# Patient Record
Sex: FEMALE | Race: WHITE | Employment: FULL TIME | URBAN - METROPOLITAN AREA
[De-identification: names, ages, dates, MRNs, and addresses within clinical notes are randomized per-mention and may not be internally consistent; named-entity substitution may affect disease eponyms.]

---

## 2018-10-02 PROBLEM — I49.3 VENTRICULAR PREMATURE CONTRACTIONS: Status: ACTIVE | Noted: 2018-10-02

## 2018-10-02 PROBLEM — F51.01 PRIMARY INSOMNIA: Status: ACTIVE | Noted: 2018-10-02

## 2018-10-02 PROBLEM — K21.9 GASTROESOPHAGEAL REFLUX DISEASE WITHOUT ESOPHAGITIS: Status: ACTIVE | Noted: 2018-10-02

## 2018-10-02 PROBLEM — E78.5 HYPERLIPIDEMIA: Status: ACTIVE | Noted: 2018-10-02

## 2018-10-02 PROBLEM — R06.09 DYSPNEA ON EXERTION: Status: ACTIVE | Noted: 2018-10-02

## 2018-10-02 PROBLEM — R00.0 SINUS TACHYCARDIA: Status: ACTIVE | Noted: 2018-10-02

## 2018-10-02 PROBLEM — I10 ESSENTIAL HYPERTENSION: Status: ACTIVE | Noted: 2018-10-02

## 2018-10-02 PROBLEM — B35.1 ONYCHOMYCOSIS: Status: ACTIVE | Noted: 2018-10-02

## 2018-10-02 PROBLEM — E11.9 TYPE 2 DIABETES MELLITUS WITHOUT COMPLICATION (HCC): Status: ACTIVE | Noted: 2018-10-02

## 2018-10-02 PROBLEM — E78.2 MIXED HYPERLIPIDEMIA: Status: ACTIVE | Noted: 2018-10-02

## 2018-10-02 PROBLEM — I45.10 RIGHT BUNDLE BRANCH BLOCK: Status: ACTIVE | Noted: 2018-10-02

## 2018-10-02 PROBLEM — J30.2 SEASONAL ALLERGIC RHINITIS: Status: ACTIVE | Noted: 2018-10-02

## 2023-05-31 ENCOUNTER — APPOINTMENT (OUTPATIENT)
Dept: GENERAL RADIOLOGY | Facility: CLINIC | Age: 53
End: 2023-05-31
Payer: COMMERCIAL

## 2023-05-31 ENCOUNTER — HOSPITAL ENCOUNTER (EMERGENCY)
Facility: CLINIC | Age: 53
Discharge: HOME | End: 2023-05-31
Attending: EMERGENCY MEDICINE
Payer: COMMERCIAL

## 2023-05-31 VITALS
WEIGHT: 165 LBS | HEART RATE: 88 BPM | BODY MASS INDEX: 27.49 KG/M2 | RESPIRATION RATE: 18 BRPM | DIASTOLIC BLOOD PRESSURE: 57 MMHG | TEMPERATURE: 98.3 F | OXYGEN SATURATION: 97 % | SYSTOLIC BLOOD PRESSURE: 120 MMHG | HEIGHT: 65 IN

## 2023-05-31 DIAGNOSIS — M25.561 ACUTE PAIN OF RIGHT KNEE: Primary | ICD-10-CM

## 2023-05-31 LAB
CHP ED QC CHECK: YES
GLUCOSE BLD-MCNC: 116 MG/DL (ref 65–105)

## 2023-05-31 PROCEDURE — 99283 EMERGENCY DEPT VISIT LOW MDM: CPT

## 2023-05-31 PROCEDURE — 82947 ASSAY GLUCOSE BLOOD QUANT: CPT

## 2023-05-31 PROCEDURE — 73562 X-RAY EXAM OF KNEE 3: CPT

## 2023-05-31 RX ORDER — BUSPIRONE HYDROCHLORIDE 15 MG/1
15 TABLET ORAL 2 TIMES DAILY
COMMUNITY
Start: 2022-12-22

## 2023-05-31 RX ORDER — DESVENLAFAXINE SUCCINATE 50 MG/1
TABLET, EXTENDED RELEASE ORAL
COMMUNITY
Start: 2023-01-27

## 2023-05-31 RX ORDER — GABAPENTIN 300 MG/1
CAPSULE ORAL
COMMUNITY
Start: 2019-08-01

## 2023-05-31 RX ORDER — ALBUTEROL SULFATE 90 UG/1
1 AEROSOL, METERED RESPIRATORY (INHALATION) PRN
COMMUNITY
Start: 2021-11-29

## 2023-05-31 RX ORDER — ASCORBIC ACID 500 MG
500 TABLET ORAL DAILY
COMMUNITY

## 2023-05-31 RX ORDER — INSULIN HUMAN 500 [IU]/ML
INJECTION, SOLUTION SUBCUTANEOUS
COMMUNITY
Start: 2021-08-25

## 2023-05-31 RX ORDER — FLUTICASONE FUROATE, UMECLIDINIUM BROMIDE AND VILANTEROL TRIFENATATE 100; 62.5; 25 UG/1; UG/1; UG/1
1 POWDER RESPIRATORY (INHALATION) DAILY
COMMUNITY
Start: 2023-01-19

## 2023-05-31 RX ORDER — ROPINIROLE 2 MG/1
2 TABLET, FILM COATED, EXTENDED RELEASE ORAL EVERY 12 HOURS
COMMUNITY
Start: 2022-02-01

## 2023-05-31 RX ORDER — ASCORBIC ACID
400 CRYSTALS ORAL DAILY
COMMUNITY

## 2023-05-31 ASSESSMENT — LIFESTYLE VARIABLES
HOW MANY STANDARD DRINKS CONTAINING ALCOHOL DO YOU HAVE ON A TYPICAL DAY: PATIENT DOES NOT DRINK
HOW OFTEN DO YOU HAVE A DRINK CONTAINING ALCOHOL: NEVER

## 2023-05-31 ASSESSMENT — PAIN - FUNCTIONAL ASSESSMENT: PAIN_FUNCTIONAL_ASSESSMENT: 0-10

## 2023-05-31 ASSESSMENT — PAIN SCALES - GENERAL: PAINLEVEL_OUTOF10: 7

## 2023-05-31 NOTE — ED NOTES
Pt glucose sensor alarming - BS 68, pt given snack, continue to monitor, pt is asymptomatic     Selma Spatz, RN  05/31/23 1245

## 2023-05-31 NOTE — ED PROVIDER NOTES
[address] [facility]      Pt Name: Juanita Chacon  MRN: [id number]  [place] 1970  Date of evaluation: 5/31/2023      CHIEF COMPLAINT       Chief Complaint   Patient presents with    Knee Pain         HISTORY OF PRESENT ILLNESS      The patient presents with right knee pain. This has been going on for about 2 weeks. She is not sure of any trauma. It has gotten worse with weightbearing. She tried wearing a brace but says it did not really help. She is not scheduled to see an orthopedist until the 19th of next month. She denies numbness or tingling. Pain is worse with weightbearing. She was unable to go to work today. Last night she says she thought she may have twisted it and made it hurt more. She has tried tramadol but says it did not really help. REVIEW OF SYSTEMS       All systems reviewed and negative unless noted in HPI. Denies any neck pain or stiffness. Denies chest pain or shortness of breath. Right knee pain as noted in HPI. Denies any weakness, numbness or focal neurologic deficit. Denies any skin rash or edema. No recent psychiatric issues. No easy bruising or bleeding. Denies any polyuria, polydypsia or history of immunocompromise. PAST MEDICAL HISTORY    has a past medical history of Acid reflux, Anxiety, Diabetes mellitus ([address] Hyperlipidemia, Hypertension, Migraine, and Tachycardia. SURGICAL HISTORY      has a past surgical history that includes back surgery.     CURRENT MEDICATIONS       Previous Medications    ALBUTEROL SULFATE HFA (PROVENTIL;VENTOLIN;PROAIR) 108 (90 BASE) MCG/ACT INHALER    Inhale 1 puff into the lungs as needed    ASCORBIC ACID (VITAMIN C) 500 MG TABLET    Take 1 tablet by mouth daily    ASPIRIN 81 MG TABLET    Take 1 tablet by mouth daily    ATORVASTATIN (LIPITOR) 20 MG TABLET        BUSPIRONE (BUSPAR) 15 MG TABLET    Take 15 mg by mouth 2 times daily    DESVENLAFAXINE SUCCINATE (PRISTIQ)

## 2023-05-31 NOTE — DISCHARGE INSTRUCTIONS
Apply ice and elevate. May take tramadol and ibuprofen as directed. See orthopedist tomorrow as scheduled. Return for worsening pain, swelling, numb or cool toes, or if worse in any way. Please understand that at this time there is no evidence for a more serious underlying process, but that early in the process of an illness or injury, an emergency department workup can be falsely reassuring. You should contact your family doctor within the next 48 hours for a follow up appointment    Pavithra Cedillo!!!    From [facility]) and [facility]    On behalf of the Emergency Department staff at [facility]), I would like to thank you for giving us the opportunity to address your health care needs and concerns. We hope that during your visit, our service was delivered in a professional and caring manner. Please keep [facility]) in mind as we walk with you down the path to your own personal wellness. Please expect an automated text message or email from us so we can ask a few questions about your health and progress. Based on your answers, a clinician may call you back to offer help and instructions. Please understand that early in the process of an illness or injury, an emergency department workup can be falsely reassuring. If you notice any worsening, changing or persistent symptoms please call your family doctor or return to the ER immediately. Tell us how we did during your visit at [email or web address]   and let us know about your experience

## 2023-06-01 ENCOUNTER — OFFICE VISIT (OUTPATIENT)
Dept: ORTHOPEDIC SURGERY | Age: 53
End: 2023-06-01

## 2023-06-01 VITALS — RESPIRATION RATE: 16 BRPM | WEIGHT: 165 LBS | OXYGEN SATURATION: 100 % | BODY MASS INDEX: 27.49 KG/M2 | HEIGHT: 65 IN

## 2023-06-01 DIAGNOSIS — M17.10 ARTHRITIS OF KNEE: Primary | ICD-10-CM

## 2023-06-01 DIAGNOSIS — M17.10 ARTHRITIS OF KNEE: ICD-10-CM

## 2023-06-01 DIAGNOSIS — M23.306 DEGENERATIVE TEAR OF MENISCUS OF RIGHT KNEE: Primary | ICD-10-CM

## 2023-06-01 RX ORDER — LIDOCAINE HYDROCHLORIDE 10 MG/ML
4 INJECTION, SOLUTION INFILTRATION; PERINEURAL ONCE
Status: COMPLETED | OUTPATIENT
Start: 2023-06-01 | End: 2023-06-01

## 2023-06-01 RX ORDER — TRIAMCINOLONE ACETONIDE 40 MG/ML
40 INJECTION, SUSPENSION INTRA-ARTICULAR; INTRAMUSCULAR ONCE
Status: COMPLETED | OUTPATIENT
Start: 2023-06-01 | End: 2023-06-01

## 2023-06-01 RX ADMIN — LIDOCAINE HYDROCHLORIDE 4 ML: 10 INJECTION, SOLUTION INFILTRATION; PERINEURAL at 15:15

## 2023-06-01 RX ADMIN — TRIAMCINOLONE ACETONIDE 40 MG: 40 INJECTION, SUSPENSION INTRA-ARTICULAR; INTRAMUSCULAR at 15:15

## 2023-06-22 ENCOUNTER — OFFICE VISIT (OUTPATIENT)
Dept: ORTHOPEDIC SURGERY | Age: 53
End: 2023-06-22
Payer: COMMERCIAL

## 2023-06-22 VITALS — OXYGEN SATURATION: 100 % | BODY MASS INDEX: 27.49 KG/M2 | WEIGHT: 165 LBS | HEIGHT: 65 IN | RESPIRATION RATE: 16 BRPM

## 2023-06-22 DIAGNOSIS — M17.10 ARTHRITIS OF KNEE: ICD-10-CM

## 2023-06-22 DIAGNOSIS — M23.306 DEGENERATIVE TEAR OF MENISCUS OF RIGHT KNEE: Primary | ICD-10-CM

## 2023-06-22 PROCEDURE — 99212 OFFICE O/P EST SF 10 MIN: CPT | Performed by: ORTHOPAEDIC SURGERY

## 2023-06-29 ENCOUNTER — HOSPITAL ENCOUNTER (OUTPATIENT)
Dept: MRI IMAGING | Facility: CLINIC | Age: 53
Discharge: HOME | End: 2023-07-01
Attending: ORTHOPAEDIC SURGERY
Payer: COMMERCIAL

## 2023-06-29 DIAGNOSIS — M23.306 DEGENERATIVE TEAR OF MENISCUS OF RIGHT KNEE: ICD-10-CM

## 2023-06-29 PROCEDURE — 73721 MRI JNT OF LWR EXTRE W/O DYE: CPT

## 2023-07-06 ENCOUNTER — OFFICE VISIT (OUTPATIENT)
Dept: ORTHOPEDIC SURGERY | Age: 53
End: 2023-07-06
Payer: COMMERCIAL

## 2023-07-06 DIAGNOSIS — M23.306 DEGENERATIVE TEAR OF MENISCUS OF RIGHT KNEE: Primary | ICD-10-CM

## 2023-07-06 PROCEDURE — 99213 OFFICE O/P EST LOW 20 MIN: CPT | Performed by: ORTHOPAEDIC SURGERY

## 2023-07-06 NOTE — PROGRESS NOTES
performed the services described in this documentation. All medical record entries made by the scribe were at my direction and in my presence. I have reviewed the chart and discharge instructions and agree that the records reflect my personal performance and is accurate and complete. Adam Riley MD. 07/06/23      Please note that this chart was generated using voice recognition Dragon dictation software. Although every effort was made to ensure the accuracy of this automated transcription, some errors in transcription may have occurred.

## 2023-07-10 ENCOUNTER — TELEPHONE (OUTPATIENT)
Dept: ORTHOPEDIC SURGERY | Age: 53
End: 2023-07-10

## 2023-07-10 NOTE — TELEPHONE ENCOUNTER
Patient called to see if she can schedule knee surgery with . She states she was told she would be called and has not received a call yet.

## 2023-08-11 ENCOUNTER — TELEPHONE (OUTPATIENT)
Dept: ORTHOPEDIC SURGERY | Facility: CLINIC | Age: 53
End: 2023-08-11

## 2023-08-11 NOTE — TELEPHONE ENCOUNTER
Patient called to report that she filed for her disability paperwork and the forms will be faxed over today from [facility]. Patient also states she did not receive the Knee scope information booklet that she was told she would get and wanted to see if that could be mailed to her? Address on file is correct.

## 2023-08-14 RX ORDER — DULOXETIN HYDROCHLORIDE 30 MG/1
1 CAPSULE, DELAYED RELEASE ORAL 2 TIMES DAILY
COMMUNITY
End: 2023-08-16

## 2023-08-14 RX ORDER — MELOXICAM 15 MG/1
TABLET ORAL
COMMUNITY
End: 2023-08-16

## 2023-08-14 RX ORDER — TRAZODONE HYDROCHLORIDE 100 MG/1
TABLET ORAL
COMMUNITY
Start: 2023-07-21

## 2023-08-14 RX ORDER — CYCLOBENZAPRINE HCL 10 MG
TABLET ORAL
COMMUNITY
End: 2023-08-16

## 2023-08-14 RX ORDER — HYDROCODONE BITARTRATE AND ACETAMINOPHEN 5; 325 MG/1; MG/1
1 TABLET ORAL EVERY 6 HOURS PRN
COMMUNITY
End: 2023-08-16

## 2023-08-14 RX ORDER — IBUPROFEN 800 MG/1
TABLET ORAL
COMMUNITY
End: 2023-08-16

## 2023-08-14 NOTE — TELEPHONE ENCOUNTER
[employer] forms received but no HUMAIRA signed. Called patient and let her know she needs to come in to sign that and can  the knee scope book at that time. She understood.

## 2023-08-16 ENCOUNTER — HOSPITAL ENCOUNTER (OUTPATIENT)
Dept: PREADMISSION TESTING | Age: 53
Discharge: HOME | End: 2023-08-16
Payer: COMMERCIAL

## 2023-08-16 VITALS
DIASTOLIC BLOOD PRESSURE: 54 MMHG | BODY MASS INDEX: 43.32 KG/M2 | WEIGHT: 260 LBS | OXYGEN SATURATION: 99 % | HEART RATE: 92 BPM | RESPIRATION RATE: 18 BRPM | SYSTOLIC BLOOD PRESSURE: 136 MMHG | TEMPERATURE: 98.4 F | HEIGHT: 65 IN

## 2023-08-16 LAB
ANION GAP SERPL CALCULATED.3IONS-SCNC: 12 MMOL/L (ref 9–17)
BASOPHILS # BLD: 0.12 K/UL (ref 0–0.2)
BASOPHILS NFR BLD: 1 % (ref 0–2)
BUN SERPL-MCNC: 18 MG/DL (ref 6–20)
CALCIUM SERPL-MCNC: 9.4 MG/DL (ref 8.6–10.4)
CHLORIDE SERPL-SCNC: 100 MMOL/L (ref 98–107)
CO2 SERPL-SCNC: 25 MMOL/L (ref 20–31)
CREAT SERPL-MCNC: 0.7 MG/DL (ref 0.5–0.9)
EOSINOPHIL # BLD: 0.12 K/UL (ref 0–0.4)
EOSINOPHILS RELATIVE PERCENT: 1 % (ref 0–4)
ERYTHROCYTE [DISTWIDTH] IN BLOOD BY AUTOMATED COUNT: 16.3 % (ref 11.5–14.9)
GFR SERPL CREATININE-BSD FRML MDRD: >60 ML/MIN/1.73M2
GLUCOSE SERPL-MCNC: 217 MG/DL (ref 70–99)
HCT VFR BLD AUTO: 37.8 % (ref 36–46)
HGB BLD-MCNC: 11.7 G/DL (ref 12–16)
LYMPHOCYTES NFR BLD: 2.2 K/UL (ref 1–4.8)
LYMPHOCYTES RELATIVE PERCENT: 19 % (ref 24–44)
MCH RBC QN AUTO: 23.6 PG (ref 26–34)
MCHC RBC AUTO-ENTMCNC: 31.1 G/DL (ref 31–37)
MCV RBC AUTO: 75.9 FL (ref 80–100)
MONOCYTES NFR BLD: 0.7 K/UL (ref 0.1–1.3)
MONOCYTES NFR BLD: 6 % (ref 1–7)
MORPHOLOGY: ABNORMAL
MORPHOLOGY: ABNORMAL
NEUTROPHILS NFR BLD: 73 % (ref 36–66)
NEUTS SEG NFR BLD: 8.46 K/UL (ref 1.3–9.1)
PLATELET # BLD AUTO: 244 K/UL (ref 150–450)
PMV BLD AUTO: 8.4 FL (ref 6–12)
POTASSIUM SERPL-SCNC: 4.4 MMOL/L (ref 3.7–5.3)
RBC # BLD AUTO: 4.98 M/UL (ref 4–5.2)
SODIUM SERPL-SCNC: 137 MMOL/L (ref 135–144)
WBC OTHER # BLD: 11.6 K/UL (ref 3.5–11)

## 2023-08-16 PROCEDURE — 93005 ELECTROCARDIOGRAM TRACING: CPT | Performed by: ANESTHESIOLOGY

## 2023-08-16 PROCEDURE — 80048 BASIC METABOLIC PNL TOTAL CA: CPT

## 2023-08-16 PROCEDURE — 85025 COMPLETE CBC W/AUTO DIFF WBC: CPT

## 2023-08-16 PROCEDURE — 36415 COLL VENOUS BLD VENIPUNCTURE: CPT

## 2023-08-16 RX ORDER — FEXOFENADINE HCL 180 MG/1
180 TABLET ORAL DAILY
COMMUNITY

## 2023-08-16 RX ORDER — FLUTICASONE PROPIONATE 50 MCG
1 SPRAY, SUSPENSION (ML) NASAL DAILY
COMMUNITY

## 2023-08-16 NOTE — DISCHARGE INSTRUCTIONS
Pre-op Instructions For Out-Patient Surgery    Medication Instructions:  Please stop herbs and any supplements now (includes vitamins and minerals). Please contact your surgeon and prescribing physician for pre-op instructions for any blood thinners. aspirin as directed. If you have inhalers/aerosol treatments at home, please use them the morning of your surgery and bring the inhalers with you to the hospital.    Please take the following medications the morning of your surgery with a sip of water:    buspar, inhalers, metoprolol     Surgery Instructions:  After midnight before surgery:  Do not eat or drink anything, including water, mints, gum, and hard candy. You may brush your teeth without swallowing. No smoking, chewing tobacco, or street drugs. Please shower or bathe before surgery. If you were given Surgical Scrub Chlorhexidine Gluconate Liquid (CHG), please shower the night before and the morning of your surgery following the detailed instructions you received during your pre-admission visit. Please do not wear any cologne, lotion, powder, deodorant, jewelry, piercings, perfume, makeup, nail polish, hair accessories, or hair spray on the day of surgery. Wear loose comfortable clothing. Leave your valuables at home. Bring a storage case for any glasses/contacts. An adult who is responsible for you MUST drive you home and should be with you for the first 24 hours after surgery. If having out-patient knee and foot surgeries, please arrange for planned crutches, walker, or wheelchair before arriving to the hospital.    The Day of Surgery:  Arrive at [facility] Surgery Entrance at the time directed by your surgeon and check in at the desk. If you have a living will or healthcare power of , please bring a copy. You will be taken to the pre-op holding area where you will be prepared for surgery.   A physical assessment will be performed by a nurse practitioner or house officer. Your IV will be started and you will meet your anesthesiologist.    When you go to surgery, your family will be directed to the surgical waiting room, where the doctor should speak with them after your surgery. After surgery, you will be taken to the recovery room and or short stay unit for recovery and preparation for discharge. If you use a Bi-PAP or C-PAP machine, please bring it with you and leave it in the car in case it is needed in recovery room.

## 2023-08-18 LAB
EKG ATRIAL RATE: 88 BPM
EKG P AXIS: 23 DEGREES
EKG P-R INTERVAL: 180 MS
EKG Q-T INTERVAL: 366 MS
EKG QRS DURATION: 90 MS
EKG QTC CALCULATION (BAZETT): 442 MS
EKG R AXIS: -16 DEGREES
EKG T AXIS: 28 DEGREES
EKG VENTRICULAR RATE: 88 BPM

## 2023-08-18 PROCEDURE — 93010 ELECTROCARDIOGRAM REPORT: CPT | Performed by: INTERNAL MEDICINE

## 2023-08-28 ENCOUNTER — ANESTHESIA EVENT (OUTPATIENT)
Dept: OPERATING ROOM | Age: 53
End: 2023-08-28
Payer: COMMERCIAL

## 2023-08-28 NOTE — PRE-PROCEDURE INSTRUCTIONS
No answer, left message ?    y                         Unable to leave message ? When were you told to arrive at hospital ?  9460    Do you have a  ? Are you on any blood thinners ? If yes when did you stop taking ? Do you have your prep Rx filled and instruction ? Nothing to eat the day before , only clear liquids. Are you experiencing any covid symptoms ? Do you have any infections or rash we should be aware of ? Do you have the Hibiclens soap to use the night before and the morning of surgery ? Nothing to eat or drink after midnight, only a sip of water to take any medication instructed to take the night before. Wear comfortable clothing, leave any valuables at home, remove any jewelry and body piercing .

## 2023-08-29 ENCOUNTER — HOSPITAL ENCOUNTER (OUTPATIENT)
Age: 53
Setting detail: OUTPATIENT SURGERY
Discharge: HOME | End: 2023-08-29
Attending: ORTHOPAEDIC SURGERY | Admitting: ORTHOPAEDIC SURGERY
Payer: COMMERCIAL

## 2023-08-29 ENCOUNTER — ANESTHESIA (OUTPATIENT)
Dept: OPERATING ROOM | Age: 53
End: 2023-08-29
Payer: COMMERCIAL

## 2023-08-29 VITALS
WEIGHT: 260 LBS | DIASTOLIC BLOOD PRESSURE: 64 MMHG | SYSTOLIC BLOOD PRESSURE: 138 MMHG | BODY MASS INDEX: 43.32 KG/M2 | HEIGHT: 65 IN | HEART RATE: 76 BPM | TEMPERATURE: 97.9 F | RESPIRATION RATE: 16 BRPM | OXYGEN SATURATION: 98 %

## 2023-08-29 DIAGNOSIS — S83.241A ACUTE MEDIAL MENISCUS TEAR OF RIGHT KNEE, INITIAL ENCOUNTER: Primary | ICD-10-CM

## 2023-08-29 LAB
GLUCOSE BLD-MCNC: 134 MG/DL (ref 65–105)
GLUCOSE BLD-MCNC: 140 MG/DL (ref 65–105)
HCG, PREGNANCY URINE (POC): NEGATIVE

## 2023-08-29 PROCEDURE — 7100000031 HC ASPR PHASE II RECOVERY - ADDTL 15 MIN: Performed by: ORTHOPAEDIC SURGERY

## 2023-08-29 PROCEDURE — 7100000011 HC PHASE II RECOVERY - ADDTL 15 MIN: Performed by: ORTHOPAEDIC SURGERY

## 2023-08-29 PROCEDURE — 7100000000 HC PACU RECOVERY - FIRST 15 MIN: Performed by: ORTHOPAEDIC SURGERY

## 2023-08-29 PROCEDURE — 6360000002 HC RX W HCPCS: Performed by: ORTHOPAEDIC SURGERY

## 2023-08-29 PROCEDURE — 7100000001 HC PACU RECOVERY - ADDTL 15 MIN: Performed by: ORTHOPAEDIC SURGERY

## 2023-08-29 PROCEDURE — 6360000002 HC RX W HCPCS

## 2023-08-29 PROCEDURE — 2500000003 HC RX 250 WO HCPCS

## 2023-08-29 PROCEDURE — 6360000002 HC RX W HCPCS: Performed by: ANESTHESIOLOGY

## 2023-08-29 PROCEDURE — 2580000003 HC RX 258: Performed by: ANESTHESIOLOGY

## 2023-08-29 PROCEDURE — 7100000010 HC PHASE II RECOVERY - FIRST 15 MIN: Performed by: ORTHOPAEDIC SURGERY

## 2023-08-29 PROCEDURE — 81025 URINE PREGNANCY TEST: CPT

## 2023-08-29 PROCEDURE — 3600000013 HC SURGERY LEVEL 3 ADDTL 15MIN: Performed by: ORTHOPAEDIC SURGERY

## 2023-08-29 PROCEDURE — 2709999900 HC NON-CHARGEABLE SUPPLY: Performed by: ORTHOPAEDIC SURGERY

## 2023-08-29 PROCEDURE — 7100000030 HC ASPR PHASE II RECOVERY - FIRST 15 MIN: Performed by: ORTHOPAEDIC SURGERY

## 2023-08-29 PROCEDURE — 6370000000 HC RX 637 (ALT 250 FOR IP): Performed by: ANESTHESIOLOGY

## 2023-08-29 PROCEDURE — 3600000003 HC SURGERY LEVEL 3 BASE: Performed by: ORTHOPAEDIC SURGERY

## 2023-08-29 PROCEDURE — 3700000001 HC ADD 15 MINUTES (ANESTHESIA): Performed by: ORTHOPAEDIC SURGERY

## 2023-08-29 PROCEDURE — 82947 ASSAY GLUCOSE BLOOD QUANT: CPT

## 2023-08-29 PROCEDURE — 3700000000 HC ANESTHESIA ATTENDED CARE: Performed by: ORTHOPAEDIC SURGERY

## 2023-08-29 RX ORDER — PROPOFOL 10 MG/ML
INJECTION, EMULSION INTRAVENOUS PRN
Status: DISCONTINUED | OUTPATIENT
Start: 2023-08-29 | End: 2023-08-29 | Stop reason: SDUPTHER

## 2023-08-29 RX ORDER — SODIUM CHLORIDE 0.9 % (FLUSH) 0.9 %
5-40 SYRINGE (ML) INJECTION PRN
Status: DISCONTINUED | OUTPATIENT
Start: 2023-08-29 | End: 2023-08-29 | Stop reason: HOSPADM

## 2023-08-29 RX ORDER — DEXAMETHASONE SODIUM PHOSPHATE 4 MG/ML
INJECTION, SOLUTION INTRA-ARTICULAR; INTRALESIONAL; INTRAMUSCULAR; INTRAVENOUS; SOFT TISSUE PRN
Status: DISCONTINUED | OUTPATIENT
Start: 2023-08-29 | End: 2023-08-29 | Stop reason: SDUPTHER

## 2023-08-29 RX ORDER — SODIUM CHLORIDE 9 MG/ML
INJECTION, SOLUTION INTRAVENOUS CONTINUOUS
Status: DISCONTINUED | OUTPATIENT
Start: 2023-08-29 | End: 2023-08-29 | Stop reason: HOSPADM

## 2023-08-29 RX ORDER — FENTANYL CITRATE 50 UG/ML
INJECTION, SOLUTION INTRAMUSCULAR; INTRAVENOUS PRN
Status: DISCONTINUED | OUTPATIENT
Start: 2023-08-29 | End: 2023-08-29 | Stop reason: SDUPTHER

## 2023-08-29 RX ORDER — SODIUM CHLORIDE 9 MG/ML
INJECTION, SOLUTION INTRAVENOUS PRN
Status: DISCONTINUED | OUTPATIENT
Start: 2023-08-29 | End: 2023-08-29 | Stop reason: HOSPADM

## 2023-08-29 RX ORDER — LIDOCAINE HYDROCHLORIDE 10 MG/ML
INJECTION, SOLUTION EPIDURAL; INFILTRATION; INTRACAUDAL; PERINEURAL PRN
Status: DISCONTINUED | OUTPATIENT
Start: 2023-08-29 | End: 2023-08-29 | Stop reason: SDUPTHER

## 2023-08-29 RX ORDER — SODIUM CHLORIDE 0.9 % (FLUSH) 0.9 %
5-40 SYRINGE (ML) INJECTION EVERY 12 HOURS SCHEDULED
Status: DISCONTINUED | OUTPATIENT
Start: 2023-08-29 | End: 2023-08-29 | Stop reason: HOSPADM

## 2023-08-29 RX ORDER — LIDOCAINE HYDROCHLORIDE 10 MG/ML
1 INJECTION, SOLUTION EPIDURAL; INFILTRATION; INTRACAUDAL; PERINEURAL
Status: DISCONTINUED | OUTPATIENT
Start: 2023-08-29 | End: 2023-08-29 | Stop reason: HOSPADM

## 2023-08-29 RX ORDER — KETOROLAC TROMETHAMINE 30 MG/ML
30 INJECTION, SOLUTION INTRAMUSCULAR; INTRAVENOUS ONCE
Status: COMPLETED | OUTPATIENT
Start: 2023-08-29 | End: 2023-08-29

## 2023-08-29 RX ORDER — ONDANSETRON 2 MG/ML
INJECTION INTRAMUSCULAR; INTRAVENOUS PRN
Status: DISCONTINUED | OUTPATIENT
Start: 2023-08-29 | End: 2023-08-29 | Stop reason: SDUPTHER

## 2023-08-29 RX ORDER — ACETAMINOPHEN 500 MG
1000 TABLET ORAL ONCE
Status: COMPLETED | OUTPATIENT
Start: 2023-08-29 | End: 2023-08-29

## 2023-08-29 RX ORDER — MIDAZOLAM HYDROCHLORIDE 1 MG/ML
INJECTION INTRAMUSCULAR; INTRAVENOUS PRN
Status: DISCONTINUED | OUTPATIENT
Start: 2023-08-29 | End: 2023-08-29 | Stop reason: SDUPTHER

## 2023-08-29 RX ORDER — ROPIVACAINE HYDROCHLORIDE 5 MG/ML
INJECTION, SOLUTION EPIDURAL; INFILTRATION; PERINEURAL PRN
Status: DISCONTINUED | OUTPATIENT
Start: 2023-08-29 | End: 2023-08-29 | Stop reason: ALTCHOICE

## 2023-08-29 RX ORDER — GABAPENTIN 300 MG/1
300 CAPSULE ORAL ONCE
Status: COMPLETED | OUTPATIENT
Start: 2023-08-29 | End: 2023-08-29

## 2023-08-29 RX ORDER — HYDROCODONE BITARTRATE AND ACETAMINOPHEN 5; 325 MG/1; MG/1
1 TABLET ORAL ONCE
Status: COMPLETED | OUTPATIENT
Start: 2023-08-29 | End: 2023-08-29

## 2023-08-29 RX ORDER — ONDANSETRON 2 MG/ML
4 INJECTION INTRAMUSCULAR; INTRAVENOUS
Status: DISCONTINUED | OUTPATIENT
Start: 2023-08-29 | End: 2023-08-29 | Stop reason: HOSPADM

## 2023-08-29 RX ORDER — DIPHENHYDRAMINE HYDROCHLORIDE 50 MG/ML
12.5 INJECTION INTRAMUSCULAR; INTRAVENOUS
Status: DISCONTINUED | OUTPATIENT
Start: 2023-08-29 | End: 2023-08-29 | Stop reason: HOSPADM

## 2023-08-29 RX ORDER — HYDROCODONE BITARTRATE AND ACETAMINOPHEN 5; 325 MG/1; MG/1
1 TABLET ORAL EVERY 6 HOURS PRN
Qty: 20 TABLET | Refills: 0 | Status: SHIPPED | OUTPATIENT
Start: 2023-08-29 | End: 2023-09-03

## 2023-08-29 RX ADMIN — SODIUM CHLORIDE: 9 INJECTION, SOLUTION INTRAVENOUS at 14:15

## 2023-08-29 RX ADMIN — ACETAMINOPHEN 1000 MG: 500 TABLET ORAL at 14:14

## 2023-08-29 RX ADMIN — ONDANSETRON 4 MG: 2 INJECTION INTRAMUSCULAR; INTRAVENOUS at 15:54

## 2023-08-29 RX ADMIN — FENTANYL CITRATE 25 MCG: 50 INJECTION, SOLUTION INTRAMUSCULAR; INTRAVENOUS at 16:16

## 2023-08-29 RX ADMIN — HYDROCODONE BITARTRATE AND ACETAMINOPHEN 1 TABLET: 5; 325 TABLET ORAL at 17:30

## 2023-08-29 RX ADMIN — FENTANYL CITRATE 50 MCG: 50 INJECTION, SOLUTION INTRAMUSCULAR; INTRAVENOUS at 16:04

## 2023-08-29 RX ADMIN — GABAPENTIN 300 MG: 300 CAPSULE ORAL at 14:14

## 2023-08-29 RX ADMIN — KETOROLAC TROMETHAMINE 30 MG: 30 INJECTION, SOLUTION INTRAMUSCULAR; INTRAVENOUS at 16:56

## 2023-08-29 RX ADMIN — FENTANYL CITRATE 25 MCG: 50 INJECTION, SOLUTION INTRAMUSCULAR; INTRAVENOUS at 15:47

## 2023-08-29 RX ADMIN — MIDAZOLAM 2 MG: 1 INJECTION INTRAMUSCULAR; INTRAVENOUS at 15:44

## 2023-08-29 RX ADMIN — DEXAMETHASONE SODIUM PHOSPHATE 8 MG: 4 INJECTION INTRA-ARTICULAR; INTRALESIONAL; INTRAMUSCULAR; INTRAVENOUS; SOFT TISSUE at 16:10

## 2023-08-29 RX ADMIN — LIDOCAINE HYDROCHLORIDE 50 MG: 10 INJECTION, SOLUTION EPIDURAL; INFILTRATION; INTRACAUDAL; PERINEURAL at 15:47

## 2023-08-29 RX ADMIN — PROPOFOL 50 MG: 10 INJECTION, EMULSION INTRAVENOUS at 16:04

## 2023-08-29 RX ADMIN — PROPOFOL 200 MG: 10 INJECTION, EMULSION INTRAVENOUS at 15:47

## 2023-08-29 ASSESSMENT — PAIN DESCRIPTION - DESCRIPTORS
DESCRIPTORS: ACHING
DESCRIPTORS: PRESSURE;HEAVINESS
DESCRIPTORS: ACHING

## 2023-08-29 ASSESSMENT — ENCOUNTER SYMPTOMS
SHORTNESS OF BREATH: 1
BACK PAIN: 1
APNEA: 1
DIARRHEA: 1
NAUSEA: 1
SHORTNESS OF BREATH: 1
CONSTIPATION: 1

## 2023-08-29 ASSESSMENT — PAIN DESCRIPTION - ORIENTATION
ORIENTATION: RIGHT
ORIENTATION: RIGHT

## 2023-08-29 ASSESSMENT — PAIN - FUNCTIONAL ASSESSMENT: PAIN_FUNCTIONAL_ASSESSMENT: 0-10

## 2023-08-29 ASSESSMENT — PAIN SCALES - GENERAL
PAINLEVEL_OUTOF10: 3
PAINLEVEL_OUTOF10: 3

## 2023-08-29 ASSESSMENT — PAIN DESCRIPTION - PAIN TYPE: TYPE: SURGICAL PAIN

## 2023-08-29 ASSESSMENT — PAIN DESCRIPTION - LOCATION
LOCATION: KNEE
LOCATION: KNEE

## 2023-08-29 NOTE — OP NOTE
Operative Note      Patient: Zaheer Garces  YOB: 1970  MRN: [id number]    Date of Procedure: 8/29/2023    Pre-Op Diagnosis Codes:     * Tear of medial meniscus of right knee, unspecified tear type, unspecified whether old or current tear, initial encounter [S83.241A]    Post-Op Diagnosis: Same       Procedure(s):  KNEE ARTHROSCOPY PARTIAL MEDIAL MENISCECTOMY right knee    Surgeon(s):  Adam Riley MD    Assistant:   Resident: Jayjay Martinez MD    Anesthesia: General    Estimated Blood Loss (mL): Minimal    Complications: None    Specimens:   * No specimens in log *    Implants:  * No implants in log *      Drains: * No LDAs found *    Findings: Tear of the posterior horn of the medial meniscus with 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 year old female who presents with a history of pain, locking, and giving away sensations of their right knee. Physical examination was positive for Sherri's examination. MRI was consistent with a tear of the posterior horn medial meniscus as well as some chondromalacia throughout most of the entire knee. Having failed conservative treatment, it was advised that arthroscopic examination and treatment of their knee would be beneficial and consent was obtained with risk and benefits explained to the patient. The patient was taken tot he operative room and general anesthesia was administered. A tourniquet was placed to the operatives lower extremity and then placed in the leg mckeon. The leg was exsanguinated and the tourniquet inflated to the 300mmHg. The leg was the prepped and draped in the usual sterile fashion. Time-out was called to verify laterality. Medial and lateral portals were made and the scope placed in the lateral portal. The patella femoral joint was examined and noted some mild grade 2 to grade II chondromalacia of the trochlear groove otherwise intact.  The scope

## 2023-08-29 NOTE — ANESTHESIA PRE PROCEDURE
Department of Anesthesiology  Preprocedure Note       Name:  Onesimo Carl   Age:  47 y.o.  :  1970                                          MRN:  [id number]         Date:  2023      Surgeon: Anita Simeon):  Dora Tucker MD    Procedure: Procedure(s):  KNEE ARTHROSCOPY PARTIAL MEDIAL MENISCECTOMY    Medications prior to admission:   Prior to Admission medications    Medication Sig Start Date End Date Taking? Authorizing Provider   fluticasone (FLONASE) 50 MCG/ACT nasal spray 1 spray by Each Nostril route daily    Historical Provider, MD   fexofenadine (ALLEGRA) 180 MG tablet Take 1 tablet by mouth daily    Historical Provider, MD   traZODone (DESYREL) 100 MG tablet TAKE 1/2 TO 1 TABLET BY MOUTH AT BEDTIME AS NEEDED FOR SLEEP 23   Historical Provider, MD   diclofenac sodium (VOLTAREN) 1 % GEL Apply 4 g topically 4 times daily as needed for Pain 23   Terry Alvarado MD   Semaglutide, 2 MG/DOSE, 8 MG/3ML SOPN Inject 2 mg into the skin every 7 days    Historical Provider, MD   rOPINIRole (REQUIP XL) 2 MG extended release tablet Take 1 tablet by mouth in the morning and 1 tablet in the evening.  22   Historical Provider, MD   insulin regular human (HUMULIN R U-500 KWIKPEN) 500 UNIT/ML SOPN concentrated injection pen 100 units twice daily 21   Historical Provider, MD   gabapentin (NEURONTIN) 300 MG capsule TAKE 1 CAPSULE BY MOUTH AT BEDTIME, MUST LAST 30 DAYS 19   Historical Provider, MD   fluticasone-umeclidin-vilant (TRELEGY ELLIPTA) 786-10.1-11 MCG/ACT AEPB inhaler Inhale 1 puff into the lungs daily  Patient not taking: Reported on 2023   Historical Provider, MD   desvenlafaxine succinate (PRISTIQ) 50 MG TB24 extended release tablet TAKE 1 TABLET BY MOUTH EVERY DAY, START AFTER COMPLETION OF 25MG TABS 23   Historical Provider, MD   ascorbic acid (VITAMIN C) 500 MG tablet Take 1 tablet by mouth daily    Historical Provider, MD   busPIRone (BUSPAR) 15 MG tablet Take 15

## 2023-08-29 NOTE — H&P
HISTORY and [id number]       NAME:  Pineda Yo  MRN: [id number]   YOB: 1970   Date: 8/29/2023   Age: 47 y.o. Gender: female       COMPLAINT AND PRESENT HISTORY:     Pineda Yo is 47 y.o.,  female, presents for KNEE ARTHROSCOPY PARTIAL MEDIAL MENISCECTOMY   Primary dx: Tear of medial meniscus of right knee, unspecified tear type, unspecified whether old or current tear, initial encounter [S83.769A]. HPI:    Pineda Yo is 47 y.o.,  female, undergoing preadmission testing for right Knee Meniscus Tear. Patient C/O of constant aching  pain swelling, stiffness, popping and cracking in the right Knee. Pain started last April and it getting worse. Pt describes the pain as 3/10 in intensity at times. The knee does buckle, give way under the patient. No locking up, no recent falls or trauma. No redness, swelling or rashes. Pain aggravated by walking/standing, climbing the stairs. treatment tried ibuprofen and voltren, tramadol. with minimal pain relief.    Pt denies fever/chills, chest pain or SOB    Review of additional significant medical hx:  (See chart for additional detail, including current medications /see ROS for current S/S):   HTN, HLD, DMII  Pt is on insulin , Amaryl, metformin, lisinopril ASA, Lipitor, Toprol XL  BP Readings from Last 3 Encounters:   08/16/23 (!) 136/54   05/31/23 (!) 120/57   02/23/19 126/73       Hemoglobin A1C   Date Value Ref Range Status   06/08/2015 6.6 (H) 4.0 - 6.0 % Final     ECG 8/16/23  Normal sinus rhythm  Incomplete RBBB  Otherwise normal ECG  No previous ECGs available    Lab Results   Component Value Date    WBC 11.6 (H) 08/16/2023    HGB 11.7 (L) 08/16/2023    HCT 37.8 08/16/2023    MCV 75.9 (L) 08/16/2023     08/16/2023     Lab Results   Component Value Date/Time     08/16/2023 03:18 PM    K 4.4 08/16/2023 03:18 PM     08/16/2023 03:18 PM    CO2 25 08/16/2023 03:18 PM    BUN 18 08/16/2023 03:18 PM

## 2023-08-30 ENCOUNTER — TELEPHONE (OUTPATIENT)
Dept: ORTHOPEDIC SURGERY | Age: 53
End: 2023-08-30

## 2023-08-30 NOTE — TELEPHONE ENCOUNTER
Pt is wanting to know if she is able to drive now or if she needs to be cleared at her post op appt on 9/8/23    Arth Rt Knee/PMM 8/29/23    Pt can be reached at [phone]

## 2023-08-30 NOTE — TELEPHONE ENCOUNTER
Called patient and let her know she needs to wait until her post op visit to get clearance to drive. Patient states she is doing wonderful, no pain.

## 2023-09-08 ENCOUNTER — OFFICE VISIT (OUTPATIENT)
Dept: ORTHOPEDIC SURGERY | Age: 53
End: 2023-09-08

## 2023-09-08 VITALS — RESPIRATION RATE: 14 BRPM | WEIGHT: 260 LBS | HEIGHT: 65 IN | BODY MASS INDEX: 43.32 KG/M2

## 2023-09-08 DIAGNOSIS — M23.306 DEGENERATIVE TEAR OF MENISCUS OF RIGHT KNEE: Primary | ICD-10-CM

## 2023-09-08 PROCEDURE — 99024 POSTOP FOLLOW-UP VISIT: CPT | Performed by: ORTHOPAEDIC SURGERY

## 2023-09-08 NOTE — PROGRESS NOTES
Patient returns today status post right knee arthroscopy with partial (medial/lateral) meniscectomy. Patient has no major complaints other than expected tightness/swelling with ROM. Sharp/stabbing pain has improved. On exam, portal sites are without redness or drainage. No calf tenderness; negative Sulema's sign. Motion is 0-100 degrees. No significant effusion. Assessment  Status post right knee arthroscopy    Plan  Patient given exercises to perform. Patient given activities/ motions to complete. Continue activities at home. Return to work. RTO PRN. Call with any future problems.

## 2023-10-16 SDOH — HEALTH STABILITY: PHYSICAL HEALTH: ON AVERAGE, HOW MANY MINUTES DO YOU ENGAGE IN EXERCISE AT THIS LEVEL?: 30 MIN

## 2023-10-16 SDOH — HEALTH STABILITY: PHYSICAL HEALTH: ON AVERAGE, HOW MANY DAYS PER WEEK DO YOU ENGAGE IN MODERATE TO STRENUOUS EXERCISE (LIKE A BRISK WALK)?: 3 DAYS

## 2023-10-16 ASSESSMENT — SOCIAL DETERMINANTS OF HEALTH (SDOH)
WITHIN THE LAST YEAR, HAVE TO BEEN RAPED OR FORCED TO HAVE ANY KIND OF SEXUAL ACTIVITY BY YOUR PARTNER OR EX-PARTNER?: NO
WITHIN THE LAST YEAR, HAVE YOU BEEN HUMILIATED OR EMOTIONALLY ABUSED IN OTHER WAYS BY YOUR PARTNER OR EX-PARTNER?: NO
WITHIN THE LAST YEAR, HAVE YOU BEEN KICKED, HIT, SLAPPED, OR OTHERWISE PHYSICALLY HURT BY YOUR PARTNER OR EX-PARTNER?: NO
WITHIN THE LAST YEAR, HAVE YOU BEEN AFRAID OF YOUR PARTNER OR EX-PARTNER?: NO

## 2023-10-17 ENCOUNTER — OFFICE VISIT (OUTPATIENT)
Age: 53
End: 2023-10-17

## 2023-10-17 VITALS — WEIGHT: 260 LBS | BODY MASS INDEX: 43.32 KG/M2 | HEIGHT: 65 IN

## 2023-10-17 DIAGNOSIS — M65.9 FLEXOR TENOSYNOVITIS OF FINGER: Primary | ICD-10-CM

## 2023-10-18 RX ORDER — METHYLPREDNISOLONE ACETATE 80 MG/ML
80 INJECTION, SUSPENSION INTRA-ARTICULAR; INTRALESIONAL; INTRAMUSCULAR; SOFT TISSUE ONCE
Status: COMPLETED | OUTPATIENT
Start: 2023-10-18 | End: 2023-10-18

## 2023-10-18 RX ORDER — LIDOCAINE HYDROCHLORIDE 10 MG/ML
1 INJECTION, SOLUTION INFILTRATION; PERINEURAL ONCE
Status: COMPLETED | OUTPATIENT
Start: 2023-10-18 | End: 2023-10-18

## 2023-10-18 RX ADMIN — LIDOCAINE HYDROCHLORIDE 1 ML: 10 INJECTION, SOLUTION INFILTRATION; PERINEURAL at 09:08

## 2023-10-18 RX ADMIN — LIDOCAINE HYDROCHLORIDE 1 ML: 10 INJECTION, SOLUTION INFILTRATION; PERINEURAL at 09:06

## 2023-10-18 RX ADMIN — METHYLPREDNISOLONE ACETATE 80 MG: 80 INJECTION, SUSPENSION INTRA-ARTICULAR; INTRALESIONAL; INTRAMUSCULAR; SOFT TISSUE at 09:07

## 2023-10-18 RX ADMIN — METHYLPREDNISOLONE ACETATE 80 MG: 80 INJECTION, SUSPENSION INTRA-ARTICULAR; INTRALESIONAL; INTRAMUSCULAR; SOFT TISSUE at 09:09

## 2023-10-18 NOTE — PROGRESS NOTES
Administrations This Visit       lidocaine 1 % injection 1 mL       Admin Date  10/18/2023  09:06 Action  Given Dose  1 mL Route  Intra-artICUlar Site  Hand Left Administered By  Britta Rogers MA    Ordering Provider: Rebeca Che MD    [address]: 30039-510-62    Lot#: [id number]    : [address]    Patient Supplied?: No               Admin Date  10/18/2023  09:08 Action  Given Dose  1 mL Route  Intra-artICUlar Site  Hand Right Administered By  Britta Rogers MA    Ordering Provider: Rebeca Che MD    [address]: 26313-284-89    Lot#: [id number]    : [address]    Patient Supplied?: No              methylPREDNISolone acetate (DEPO-MEDROL) injection 80 mg       Admin Date  10/18/2023  09:07 Action  Given Dose  80 mg Route  Intra-artICUlar Site  Hand Left Administered By  Britta Rogers MA    Ordering Provider: Rebeca Che MD    NDC: 0215-6875-29    Lot#: [id number]    : [address]. Patient Supplied?: No               Admin Date  10/18/2023  09:09 Action  Given Dose  80 mg Route  Intra-artICUlar Site  Hand Right Administered By  Britta Rogers MA    Ordering Provider: Rebeca Che MD    NDC: 5441-8780-18    Lot#: [id number]    : [address]. Patient Supplied?: No                    Medication was administered by provider, Dr Myles Avila. No adverse reactions.     Britta Rogers MA.
tablet by mouth in the morning and 1 tablet in the evening., Disp: , Rfl:     insulin regular human (HUMULIN R U-500 KWIKPEN) 500 UNIT/ML SOPN concentrated injection pen, 100 units twice daily, Disp: , Rfl:     gabapentin (NEURONTIN) 300 MG capsule, TAKE 1 CAPSULE BY MOUTH AT BEDTIME, MUST LAST 30 DAYS, Disp: , Rfl:     fluticasone-umeclidin-vilant (TRELEGY ELLIPTA) 100-62.5-25 MCG/ACT AEPB inhaler, Inhale 1 puff into the lungs daily (Patient not taking: Reported on 8/29/2023), Disp: , Rfl:     desvenlafaxine succinate (PRISTIQ) 50 MG TB24 extended release tablet, TAKE 1 TABLET BY MOUTH EVERY DAY, START AFTER COMPLETION OF 25MG TABS, Disp: , Rfl:     ascorbic acid (VITAMIN C) 500 MG tablet, Take 1 tablet by mouth daily, Disp: , Rfl:     busPIRone (BUSPAR) 15 MG tablet, Take 15 mg by mouth 3 times daily, Disp: , Rfl:     albuterol sulfate HFA (PROVENTIL;VENTOLIN;PROAIR) 108 (90 Base) MCG/ACT inhaler, Inhale 1 puff into the lungs as needed, Disp: , Rfl:     glimepiride (AMARYL) 4 MG tablet, Take 1 tablet by mouth every morning (before breakfast), Disp: , Rfl:     metFORMIN (GLUCOPHAGE-XR) 500 MG extended release tablet, Take 2 tablets by mouth 2 times daily, Disp: , Rfl:     rizatriptan (MAXALT) 10 MG tablet, Take 1 tablet by mouth once as needed, Disp: , Rfl:     ONE TOUCH ULTRA TEST strip, , Disp: , Rfl:     lisinopril (PRINIVIL;ZESTRIL) 10 MG tablet, Take 1 tablet by mouth daily, Disp: , Rfl:     aspirin 81 MG tablet, Take 1 tablet by mouth daily, Disp: , Rfl:     atorvastatin (LIPITOR) 20 MG tablet, , Disp: , Rfl:     metoprolol succinate (TOPROL XL) 100 MG extended release tablet, 1 tablet daily, Disp: , Rfl:     traMADol (ULTRAM) 50 MG tablet, , Disp: , Rfl:   Allergies   Allergen Reactions    Actos [Pioglitazone] Shortness Of Breath    Canagliflozin Other (See Comments)    Reglan  [Metoclopramide]      Other reaction(s): Unknown    Tuberculin Ppd Other (See Comments)    Adhesive Tape Rash     Social History

## 2023-11-21 ENCOUNTER — OFFICE VISIT (OUTPATIENT)
Dept: PODIATRY | Age: 53
End: 2023-11-21
Payer: COMMERCIAL

## 2023-11-21 VITALS — HEIGHT: 65 IN | BODY MASS INDEX: 43.32 KG/M2 | WEIGHT: 260 LBS

## 2023-11-21 DIAGNOSIS — M79.605 PAIN IN BOTH LOWER EXTREMITIES: ICD-10-CM

## 2023-11-21 DIAGNOSIS — M79.604 PAIN IN BOTH LOWER EXTREMITIES: ICD-10-CM

## 2023-11-21 DIAGNOSIS — M72.2 PLANTAR FASCIITIS: Primary | ICD-10-CM

## 2023-11-21 PROCEDURE — L3020 FOOT LONGITUD/METATARSAL SUP: HCPCS | Performed by: PODIATRIST

## 2023-11-21 PROCEDURE — 99203 OFFICE O/P NEW LOW 30 MIN: CPT | Performed by: PODIATRIST

## 2023-11-21 RX ORDER — ATOGEPANT 60 MG/1
0.5 TABLET ORAL DAILY
COMMUNITY
Start: 2023-10-30

## 2023-11-21 RX ORDER — FLUTICASONE PROPIONATE 50 MCG
SPRAY, SUSPENSION (ML) NASAL
COMMUNITY
Start: 2023-01-26

## 2023-11-21 RX ORDER — TIZANIDINE HYDROCHLORIDE 2 MG/1
2 CAPSULE, GELATIN COATED ORAL
COMMUNITY

## 2023-11-21 RX ORDER — ONDANSETRON 8 MG/1
TABLET, ORALLY DISINTEGRATING ORAL
COMMUNITY

## 2023-11-21 NOTE — PROGRESS NOTES
[address] [facility]  Dept: [phone]    NEW PATIENT PROGRESS NOTE  Date of patient's visit: 11/21/2023  Patient's Name:  Zaheer Garces YOB: 1970            Patient Care Team:  Suzan Simpson MD as PCP - General (Family Medicine)  Radu Keane DPM as Physician (Podiatry)        Chief Complaint   Patient presents with    New Patient     Re-Establish care    Foot Pain     Left foot pain x 4 months. Pt states she feels like she is walking on something. HPI:   Zaheer Garces is a 47 y.o. female who presents to the office today complaining of left foot discomfort. Patient states she feels like she is walking on something. Symptoms began 4 month(s) ago. Patient relates pain is present . Pain is rated 4 out of 10 and is described as none. Treatments prior to today's visit include: none. Currently denies F/C/N/V. Pt's primary care physician is Suzan Simpson MD last seen November 10 2023     Allergies   Allergen Reactions    Actos [Pioglitazone] Shortness Of Breath    Canagliflozin Other (See Comments)    Other      Other reaction(s): Flushing    Reglan  [Metoclopramide]      Other reaction(s): Unknown    Tuberculin Ppd Other (See Comments)    Adhesive Tape Rash       Past Medical History:   Diagnosis Date    Acid reflux     Anxiety     Asthma     Diabetes mellitus ([address])     Hyperlipidemia     Hypertension     Migraine     Sleep apnea     uses bipap    Tachycardia        Prior to Admission medications    Medication Sig Start Date End Date Taking?  Authorizing Provider   fluticasone (FLONASE) 50 MCG/ACT nasal spray 1 spray by Each Nostril route daily   Yes ProviderRupinder MD   fexofenadine (ALLEGRA) 180 MG tablet Take 1 tablet by mouth daily   Yes Provider, MD Rupinder   traZODone (DESYREL) 100 MG tablet TAKE 1/2 TO 1 TABLET BY MOUTH AT BEDTIME AS NEEDED FOR SLEEP 7/21/23  Yes

## 2024-03-15 ENCOUNTER — OFFICE VISIT (OUTPATIENT)
Dept: ORTHOPEDIC SURGERY | Age: 54
End: 2024-03-15

## 2024-03-15 DIAGNOSIS — M17.10 ARTHRITIS OF KNEE: Primary | ICD-10-CM

## 2024-03-15 SDOH — HEALTH STABILITY: PHYSICAL HEALTH: ON AVERAGE, HOW MANY MINUTES DO YOU ENGAGE IN EXERCISE AT THIS LEVEL?: 60 MIN

## 2024-03-15 SDOH — HEALTH STABILITY: PHYSICAL HEALTH: ON AVERAGE, HOW MANY DAYS PER WEEK DO YOU ENGAGE IN MODERATE TO STRENUOUS EXERCISE (LIKE A BRISK WALK)?: 5 DAYS

## 2024-03-15 NOTE — PROGRESS NOTES
Hyperlipidemia     Hypertension     Migraine     Sleep apnea     uses bipap    Tachycardia      Past Surgical History:   Procedure Laterality Date    BACK SURGERY  2003    discectomy    CARPAL TUNNEL RELEASE Right 02/10/2022    CARPAL TUNNEL RELEASE Left 02/02/2023    with trigger finger release    COLONOSCOPY      ESOPHAGOGASTRODUODENOSCOPY      KNEE ARTHROSCOPY Right 8/29/2023    KNEE ARTHROSCOPY PARTIAL MEDIAL MENISCECTOMY performed by Dean Cerrato MD at [facility] OR    SKIN BIOPSY      ears    WISDOM TOOTH EXTRACTION       No family history on file.   Plan  Patient be scheduled for MRI of her left knee to rule out medial meniscus tear.  If indeed this is positive then would consider arthroscopic intervention.  Since the patient is familiar with this procedure with all the risk and details procedure.  Will await the results of the MRI    Provider Attestation:  I, Dean Cerrato, personally performed the services described in this documentation. All medical record entries made by the scribe were at my direction and in my presence. I have reviewed the chart and discharge instructions and agree that the records reflect my personal performance and is accurate and complete. Dean Cerrato MD. 03/15/24      Please note that this chart was generated using voice recognition Dragon dictation software.  Although every effort was made to ensure the accuracy of this automated transcription, some errors in transcription may have occurred.

## 2024-04-01 ENCOUNTER — HOSPITAL ENCOUNTER (OUTPATIENT)
Dept: MRI IMAGING | Facility: CLINIC | Age: 54
Discharge: HOME | End: 2024-04-03
Payer: COMMERCIAL

## 2024-04-01 DIAGNOSIS — M17.10 ARTHRITIS OF KNEE: ICD-10-CM

## 2024-04-01 PROCEDURE — 73721 MRI JNT OF LWR EXTRE W/O DYE: CPT

## 2024-04-04 ENCOUNTER — TELEPHONE (OUTPATIENT)
Dept: ORTHOPEDIC SURGERY | Age: 54
End: 2024-04-04

## 2024-04-30 ENCOUNTER — OFFICE VISIT (OUTPATIENT)
Age: 54
End: 2024-04-30
Payer: COMMERCIAL

## 2024-04-30 DIAGNOSIS — M65.9 FLEXOR TENOSYNOVITIS OF FINGER: Primary | ICD-10-CM

## 2024-04-30 PROCEDURE — 20550 NJX 1 TENDON SHEATH/LIGAMENT: CPT | Performed by: ORTHOPAEDIC SURGERY

## 2024-04-30 PROCEDURE — 99213 OFFICE O/P EST LOW 20 MIN: CPT | Performed by: ORTHOPAEDIC SURGERY

## 2024-04-30 RX ADMIN — LIDOCAINE HYDROCHLORIDE 1 ML: 10 INJECTION, SOLUTION INFILTRATION; PERINEURAL at 10:34

## 2024-04-30 RX ADMIN — METHYLPREDNISOLONE ACETATE 40 MG: 40 INJECTION, SUSPENSION INTRA-ARTICULAR; INTRALESIONAL; INTRAMUSCULAR; SOFT TISSUE at 10:35

## 2024-04-30 NOTE — PROGRESS NOTES
[facility]      [facility]YS[facility]  [facility]THOP[facility] MEDICINE  [address]  [place] [address]  Dept: [phone]  Dept Fax: [phone]    Chief Compliant:  No chief complaint on file.       History of Present Illness:  This is a pleasant 53 y.o. female who is here today for reevaluation of her right middle finger and left middle finger.  I saw her about 6 months ago and diagnosed her with left middle finger and right middle finger flexor tenosynovitis.  I gave her cortisone injections to both fingers.  This gave her almost 6 months of relief.  She has an upcoming knee surgery with Dr. Cerrato and wanted to have her fingers reevaluated.    Physical Exam: The right middle finger has tenderness to the A1 pulley.  She has some active triggering in the office today.  There is no cutaneous lesions.  The left middle finger has some tenderness to the A1 pulley.  She has some active triggering in the office today.    Imaging: None      Assessment and Plan:    This is a pleasant 53 y.o. female who has recurrent right middle finger flexor tenosynovitis and recurrent left middle finger flexor tenosynovitis she responded well to cortisone injection about 6 months ago.  She did give about 6 months of relief setting is reasonable to repeat cortisone injection.  Is what she would like to do.  Verbal consent was obtained.  After the skin was cleansed with alcohol, I injected 40 mg of Depomedrol and local anesthetic to the right middle finger A1 pulley.  Risks of cortisone injection include but are not limited to injection site pain, hyperglycemia, and infection. The patient tolerated it well.  Verbal consent was obtained.  After the skin was cleansed with alcohol, I injected 40 mg of Depomedrol and local anesthetic to the left middle finger A1 pulley.  Risks of cortisone injection include but are not limited to injection site

## 2024-05-01 RX ORDER — METHYLPREDNISOLONE ACETATE 40 MG/ML
40 INJECTION, SUSPENSION INTRA-ARTICULAR; INTRALESIONAL; INTRAMUSCULAR; SOFT TISSUE ONCE
Status: COMPLETED | OUTPATIENT
Start: 2024-05-01 | End: 2024-04-30

## 2024-05-01 RX ORDER — LIDOCAINE HYDROCHLORIDE 10 MG/ML
1 INJECTION, SOLUTION INFILTRATION; PERINEURAL ONCE
Status: COMPLETED | OUTPATIENT
Start: 2024-05-01 | End: 2024-04-30

## 2024-05-17 ENCOUNTER — TELEPHONE (OUTPATIENT)
Dept: ORTHOPEDIC SURGERY | Age: 54
End: 2024-05-17

## 2024-05-17 NOTE — TELEPHONE ENCOUNTER
Patient called office to verify that our office received disability paperwork on her behalf from [email or web address]. Patient states that she called office yesterday to verify our fax number since there has been an on-going issue with our office receiving the paperwork via fax. I informed patient that I do not see any paperwork scanned into her chart nor do I send messages in her chart about receiving paperwork. I double-checked with the medical assistant that frequently handles Dr. Cerrato paperwork () and she denied having it as well. I provided patient with [facility] fax number [phone] again. Patient was planning on calling [employer] again about the issue but did mention that she may print the paperwork off and bring it to the office herself. Of note I do see a current [employer] HUMAIRA on file as of 8/16/23.

## 2024-05-20 ENCOUNTER — TELEPHONE (OUTPATIENT)
Dept: ORTHOPEDIC SURGERY | Age: 54
End: 2024-05-20

## 2024-05-20 RX ORDER — NYSTATIN 100000 [USP'U]/G
1 POWDER TOPICAL
COMMUNITY
Start: 2024-03-11 | End: 2024-05-23

## 2024-05-20 RX ORDER — ZOLPIDEM TARTRATE 10 MG/1
TABLET ORAL
COMMUNITY
Start: 2024-04-11

## 2024-05-20 NOTE — TELEPHONE ENCOUNTER
Patient contacted [place] [facility] in regards to paperwork.     Informed patient that no paperwork has been received by office.

## 2024-05-21 ENCOUNTER — TELEPHONE (OUTPATIENT)
Dept: ORTHOPEDIC SURGERY | Age: 54
End: 2024-05-21

## 2024-05-21 NOTE — TELEPHONE ENCOUNTER
Paperwork received by [facility].     Release of Information on file for [employer], signed 8/16/2023.    Paperwork place on [facility] desk.

## 2024-05-23 ENCOUNTER — HOSPITAL ENCOUNTER (OUTPATIENT)
Dept: PREADMISSION TESTING | Age: 54
Discharge: HOME | End: 2024-05-23
Attending: ORTHOPAEDIC SURGERY | Admitting: ORTHOPAEDIC SURGERY
Payer: COMMERCIAL

## 2024-05-23 VITALS
OXYGEN SATURATION: 97 % | HEIGHT: 65 IN | WEIGHT: 232 LBS | DIASTOLIC BLOOD PRESSURE: 75 MMHG | HEART RATE: 84 BPM | RESPIRATION RATE: 18 BRPM | BODY MASS INDEX: 38.65 KG/M2 | SYSTOLIC BLOOD PRESSURE: 157 MMHG | TEMPERATURE: 98.4 F

## 2024-05-23 LAB
ANION GAP SERPL CALCULATED.3IONS-SCNC: 12 MMOL/L (ref 9–17)
BASOPHILS # BLD: 0.08 K/UL (ref 0–0.2)
BASOPHILS NFR BLD: 1 % (ref 0–2)
BUN SERPL-MCNC: 18 MG/DL (ref 6–20)
CALCIUM SERPL-MCNC: 9.9 MG/DL (ref 8.6–10.4)
CHLORIDE SERPL-SCNC: 100 MMOL/L (ref 98–107)
CO2 SERPL-SCNC: 27 MMOL/L (ref 20–31)
CREAT SERPL-MCNC: 0.8 MG/DL (ref 0.5–0.9)
EKG ATRIAL RATE: 82 BPM
EKG P AXIS: 44 DEGREES
EKG P-R INTERVAL: 136 MS
EKG Q-T INTERVAL: 356 MS
EKG QRS DURATION: 80 MS
EKG QTC CALCULATION (BAZETT): 415 MS
EKG R AXIS: -17 DEGREES
EKG T AXIS: 52 DEGREES
EKG VENTRICULAR RATE: 82 BPM
EOSINOPHIL # BLD: 0.08 K/UL (ref 0–0.4)
EOSINOPHILS RELATIVE PERCENT: 1 % (ref 0–4)
ERYTHROCYTE [DISTWIDTH] IN BLOOD BY AUTOMATED COUNT: 16.1 % (ref 11.5–14.9)
GFR, ESTIMATED: 89 ML/MIN/1.73M2
GLUCOSE SERPL-MCNC: 215 MG/DL (ref 70–99)
HCT VFR BLD AUTO: 39.9 % (ref 36–46)
HGB BLD-MCNC: 12.6 G/DL (ref 12–16)
LYMPHOCYTES NFR BLD: 1.51 K/UL (ref 1–4.8)
LYMPHOCYTES RELATIVE PERCENT: 18 % (ref 24–44)
MCH RBC QN AUTO: 25.4 PG (ref 26–34)
MCHC RBC AUTO-ENTMCNC: 31.6 G/DL (ref 31–37)
MCV RBC AUTO: 80.2 FL (ref 80–100)
MONOCYTES NFR BLD: 0.5 K/UL (ref 0.1–1.3)
MONOCYTES NFR BLD: 6 % (ref 1–7)
MORPHOLOGY: ABNORMAL
MORPHOLOGY: ABNORMAL
NEUTROPHILS NFR BLD: 74 % (ref 36–66)
NEUTS SEG NFR BLD: 6.23 K/UL (ref 1.3–9.1)
PLATELET # BLD AUTO: 196 K/UL (ref 150–450)
PMV BLD AUTO: 8.2 FL (ref 6–12)
POTASSIUM SERPL-SCNC: 4.4 MMOL/L (ref 3.7–5.3)
RBC # BLD AUTO: 4.97 M/UL (ref 4–5.2)
SODIUM SERPL-SCNC: 139 MMOL/L (ref 135–144)
WBC OTHER # BLD: 8.4 K/UL (ref 3.5–11)

## 2024-05-23 PROCEDURE — 36415 COLL VENOUS BLD VENIPUNCTURE: CPT

## 2024-05-23 PROCEDURE — 80048 BASIC METABOLIC PNL TOTAL CA: CPT

## 2024-05-23 PROCEDURE — 85025 COMPLETE CBC W/AUTO DIFF WBC: CPT

## 2024-05-23 PROCEDURE — APPSS45 APP SPLIT SHARED TIME 31-45 MINUTES: Performed by: NURSE PRACTITIONER

## 2024-05-23 ASSESSMENT — ENCOUNTER SYMPTOMS
APNEA: 1
SHORTNESS OF BREATH: 0
TROUBLE SWALLOWING: 0
GASTROINTESTINAL NEGATIVE: 1
ABDOMINAL PAIN: 0
BACK PAIN: 1
COUGH: 0
SORE THROAT: 0

## 2024-05-23 NOTE — H&P (VIEW-ONLY)
100 05/23/2024 07:30 AM    CO2 27 05/23/2024 07:30 AM    BUN 18 05/23/2024 07:30 AM    CREATININE 0.8 05/23/2024 07:30 AM    GLUCOSE 215 05/23/2024 07:30 AM    CALCIUM 9.9 05/23/2024 07:30 AM    LABGLOM 89 05/23/2024 07:30 AM    LABGLOM >60 08/16/2023 03:18 PM       PRELIMINARY EKG REVIEW, DATE: 5/23/2024   Normal sinus rhythm  Normal ECG  SURGERY / PROVISIONAL DIAGNOSES:      KNEE ARTHROSCOPY PARTIAL MEDIAL MENISCECTOMY    Tear of medial meniscus of left knee, current, unspecified tear type, initial encounter [S83.096A]    Patient Active Problem List    Diagnosis Date Noted    Dyspnea on exertion 10/02/2018    Essential hypertension 10/02/2018    Gastroesophageal reflux disease without esophagitis 10/02/2018    Generalized anxiety disorder 10/02/2018    Hyperlipidemia 10/02/2018    Mixed hyperlipidemia 10/02/2018    Onychomycosis 10/02/2018    Primary insomnia 10/02/2018    Right bundle branch block 10/02/2018    Seasonal allergic rhinitis 10/02/2018    Sinus tachycardia 10/02/2018    Type 2 diabetes mellitus without complication (HCC) 10/02/2018    Ventricular premature contractions 10/02/2018           CLEARANCE: Based on my personal evaluation of patient including review of patient's chart, No clearance requested  for scheduled surgery     Total time spent on encounter- PAT provider minutes: 21-30 minutes     NAYA Eddy CNP on 5/23/2024 at 7:38 AM

## 2024-05-23 NOTE — H&P
TABLET BY MOUTH EVERY DAY, START AFTER COMPLETION OF 25MG TABS      ascorbic acid (VITAMIN C) 500 MG tablet Take 1 tablet by mouth daily      busPIRone (BUSPAR) 15 MG tablet Take 15 mg by mouth 3 times daily      albuterol sulfate HFA (PROVENTIL;VENTOLIN;PROAIR) 108 (90 Base) MCG/ACT inhaler Inhale 1 puff into the lungs as needed      glimepiride (AMARYL) 4 MG tablet Take 2 tablets by mouth every morning (before breakfast)      metFORMIN (GLUCOPHAGE-XR) 500 MG extended release tablet Take 2 tablets by mouth 2 times daily      rizatriptan (MAXALT) 10 MG tablet Take 1 tablet by mouth once as needed      ONE TOUCH ULTRA TEST strip       lisinopril (PRINIVIL;ZESTRIL) 10 MG tablet Take 1 tablet by mouth daily      aspirin 81 MG tablet Take 1 tablet by mouth daily      atorvastatin (LIPITOR) 20 MG tablet       metoprolol succinate (TOPROL XL) 100 MG extended release tablet 1 tablet at bedtime      traMADol (ULTRAM) 50 MG tablet 1 tablet every 6 hours as needed. migraines       No current facility-administered medications on file prior to encounter.       Review of Systems   Constitutional:  Positive for activity change (decreased) and appetite change (decreased). Negative for chills and fever.   HENT: Negative.  Negative for dental problem, ear pain, sore throat and trouble swallowing.    Eyes:  Positive for visual disturbance (wears glasses).   Respiratory:  Positive for apnea (bipap). Negative for cough and shortness of breath.    Cardiovascular:  Positive for leg swelling. Negative for chest pain and palpitations.   Gastrointestinal: Negative.  Negative for abdominal pain.   Genitourinary:  Negative for dysuria and hematuria.   Musculoskeletal:  Positive for back pain. Negative for arthralgias and neck pain.        See HPI   Skin: Negative.    Neurological:  Positive for dizziness and headaches (Migraines). Negative for seizures, speech difficulty, weakness and numbness.   Hematological:  Does not bruise/bleed easily.  100 05/23/2024 07:30 AM    CO2 27 05/23/2024 07:30 AM    BUN 18 05/23/2024 07:30 AM    CREATININE 0.8 05/23/2024 07:30 AM    GLUCOSE 215 05/23/2024 07:30 AM    CALCIUM 9.9 05/23/2024 07:30 AM    LABGLOM 89 05/23/2024 07:30 AM    LABGLOM >60 08/16/2023 03:18 PM       PRELIMINARY EKG REVIEW, DATE: 5/23/2024   Normal sinus rhythm  Normal ECG  SURGERY / PROVISIONAL DIAGNOSES:      KNEE ARTHROSCOPY PARTIAL MEDIAL MENISCECTOMY    Tear of medial meniscus of left knee, current, unspecified tear type, initial encounter [S83.171A]    Patient Active Problem List    Diagnosis Date Noted    Dyspnea on exertion 10/02/2018    Essential hypertension 10/02/2018    Gastroesophageal reflux disease without esophagitis 10/02/2018    Generalized anxiety disorder 10/02/2018    Hyperlipidemia 10/02/2018    Mixed hyperlipidemia 10/02/2018    Onychomycosis 10/02/2018    Primary insomnia 10/02/2018    Right bundle branch block 10/02/2018    Seasonal allergic rhinitis 10/02/2018    Sinus tachycardia 10/02/2018    Type 2 diabetes mellitus without complication (HCC) 10/02/2018    Ventricular premature contractions 10/02/2018           CLEARANCE: Based on my personal evaluation of patient including review of patient's chart, No clearance requested  for scheduled surgery     Total time spent on encounter- PAT provider minutes: 21-30 minutes     NAYA Eddy CNP on 5/23/2024 at 7:38 AM

## 2024-05-23 NOTE — DISCHARGE INSTRUCTIONS
Pre-op Instructions For Out-Patient Surgery    Medication Instructions:  Please stop herbs and any supplements now (includes vitamins and minerals).    Please contact your surgeon and prescribing physician for pre-op instructions for any blood thinners. Stop Aspirin as directed    If you have inhalers/aerosol treatments at home, please use them the morning of your surgery and bring the inhalers with you to the hospital.    Please take the following medications the morning of your surgery with a sip of water:    Buspar, Inhaler    Surgery Instructions:  After midnight before surgery:  Do not eat or drink anything, including water, mints, gum, and hard candy.  You may brush your teeth without swallowing.  No smoking, chewing tobacco, or street drugs.    Please shower or bathe before surgery.  If you were given Surgical Scrub Chlorhexidine Gluconate Liquid (CHG), please shower the night before and the morning of your surgery following the detailed instructions you received during your pre-admission visit.     Please do not wear any cologne, lotion, powder, deodorant, jewelry, piercings, perfume, makeup, nail polish, hair accessories, or hair spray on the day of surgery.  Wear loose comfortable clothing.    Leave your valuables at home but bring a payment source for any after-surgery prescriptions you plan to fill at [facility].  Bring a storage case for any glasses/contacts.    An adult who is responsible for you MUST drive you home and should be with you for the first 24 hours after surgery.     If having out-patient knee and foot surgeries, please arrange for planned crutches, walker, or wheelchair before arriving to the hospital.    The Day of Surgery:  Arrive at [facility] at the time directed by your surgeon and check in at the desk.     If you have a living will or healthcare power of , please bring a copy.    You will be taken to the

## 2024-05-31 LAB
EKG ATRIAL RATE: 82 BPM
EKG P AXIS: 44 DEGREES
EKG P-R INTERVAL: 136 MS
EKG Q-T INTERVAL: 356 MS
EKG QRS DURATION: 80 MS
EKG QTC CALCULATION (BAZETT): 415 MS
EKG R AXIS: -17 DEGREES
EKG T AXIS: 52 DEGREES
EKG VENTRICULAR RATE: 82 BPM

## 2024-06-05 ENCOUNTER — ANESTHESIA EVENT (OUTPATIENT)
Dept: OPERATING ROOM | Age: 54
End: 2024-06-05
Payer: COMMERCIAL

## 2024-06-05 NOTE — PRE-PROCEDURE INSTRUCTIONS
No answer, left message ?      yes                       Unable to leave message ?    When were you told to arrive at hospital ?  0700    Do you have a  ?    Are you on any blood thinners ?                     If yes when did you stop taking ?    Do you have your prep Rx filled and instruction ?      Nothing to eat the day before , only clear liquids.    Are you experiencing any covid symptoms ?     Do you have any infections or rash we should be aware of ?      Do you have the Hibiclens soap to use the night before and the morning of surgery ?    Nothing to eat or drink after midnight, only a sip of water to take any medication instructed to take the night before.  Wear comfortable clothing, leave any valuables at home, remove any jewelry and body piercing .

## 2024-06-06 ENCOUNTER — ANESTHESIA (OUTPATIENT)
Dept: OPERATING ROOM | Age: 54
End: 2024-06-06
Payer: COMMERCIAL

## 2024-06-06 ENCOUNTER — HOSPITAL ENCOUNTER (OUTPATIENT)
Age: 54
Setting detail: OUTPATIENT SURGERY
Discharge: HOME | End: 2024-06-06
Attending: ORTHOPAEDIC SURGERY | Admitting: ORTHOPAEDIC SURGERY
Payer: COMMERCIAL

## 2024-06-06 VITALS
HEART RATE: 80 BPM | DIASTOLIC BLOOD PRESSURE: 63 MMHG | WEIGHT: 232 LBS | TEMPERATURE: 96.9 F | OXYGEN SATURATION: 94 % | BODY MASS INDEX: 38.65 KG/M2 | RESPIRATION RATE: 16 BRPM | SYSTOLIC BLOOD PRESSURE: 131 MMHG | HEIGHT: 65 IN

## 2024-06-06 DIAGNOSIS — S83.242A ACUTE MEDIAL MENISCUS TEAR OF LEFT KNEE, INITIAL ENCOUNTER: Primary | ICD-10-CM

## 2024-06-06 LAB
GLUCOSE BLD-MCNC: 138 MG/DL (ref 65–105)
GLUCOSE BLD-MCNC: 186 MG/DL (ref 65–105)

## 2024-06-06 PROCEDURE — 7100000011 HC PHASE II RECOVERY - ADDTL 15 MIN: Performed by: ORTHOPAEDIC SURGERY

## 2024-06-06 PROCEDURE — 3600000003 HC SURGERY LEVEL 3 BASE: Performed by: ORTHOPAEDIC SURGERY

## 2024-06-06 PROCEDURE — 81025 URINE PREGNANCY TEST: CPT

## 2024-06-06 PROCEDURE — 7100000001 HC PACU RECOVERY - ADDTL 15 MIN: Performed by: ORTHOPAEDIC SURGERY

## 2024-06-06 PROCEDURE — 3600000013 HC SURGERY LEVEL 3 ADDTL 15MIN: Performed by: ORTHOPAEDIC SURGERY

## 2024-06-06 PROCEDURE — 82947 ASSAY GLUCOSE BLOOD QUANT: CPT

## 2024-06-06 PROCEDURE — 7100000010 HC PHASE II RECOVERY - FIRST 15 MIN: Performed by: ORTHOPAEDIC SURGERY

## 2024-06-06 PROCEDURE — 2500000003 HC RX 250 WO HCPCS: Performed by: ANESTHESIOLOGY

## 2024-06-06 PROCEDURE — 6370000000 HC RX 637 (ALT 250 FOR IP): Performed by: ANESTHESIOLOGY

## 2024-06-06 PROCEDURE — 6360000002 HC RX W HCPCS: Performed by: ANESTHESIOLOGY

## 2024-06-06 PROCEDURE — 7100000031 HC ASPR PHASE II RECOVERY - ADDTL 15 MIN: Performed by: ORTHOPAEDIC SURGERY

## 2024-06-06 PROCEDURE — 6360000002 HC RX W HCPCS: Performed by: ORTHOPAEDIC SURGERY

## 2024-06-06 PROCEDURE — 3700000001 HC ADD 15 MINUTES (ANESTHESIA): Performed by: ORTHOPAEDIC SURGERY

## 2024-06-06 PROCEDURE — 6360000002 HC RX W HCPCS: Performed by: NURSE ANESTHETIST, CERTIFIED REGISTERED

## 2024-06-06 PROCEDURE — 7100000000 HC PACU RECOVERY - FIRST 15 MIN: Performed by: ORTHOPAEDIC SURGERY

## 2024-06-06 PROCEDURE — 2709999900 HC NON-CHARGEABLE SUPPLY: Performed by: ORTHOPAEDIC SURGERY

## 2024-06-06 PROCEDURE — 3700000000 HC ANESTHESIA ATTENDED CARE: Performed by: ORTHOPAEDIC SURGERY

## 2024-06-06 PROCEDURE — 7100000030 HC ASPR PHASE II RECOVERY - FIRST 15 MIN: Performed by: ORTHOPAEDIC SURGERY

## 2024-06-06 PROCEDURE — 2580000003 HC RX 258: Performed by: ANESTHESIOLOGY

## 2024-06-06 PROCEDURE — 2500000003 HC RX 250 WO HCPCS: Performed by: NURSE ANESTHETIST, CERTIFIED REGISTERED

## 2024-06-06 RX ORDER — ONDANSETRON 2 MG/ML
4 INJECTION INTRAMUSCULAR; INTRAVENOUS
Status: COMPLETED | OUTPATIENT
Start: 2024-06-06 | End: 2024-06-06

## 2024-06-06 RX ORDER — SODIUM CHLORIDE 9 MG/ML
INJECTION, SOLUTION INTRAVENOUS CONTINUOUS
Status: DISCONTINUED | OUTPATIENT
Start: 2024-06-06 | End: 2024-06-06 | Stop reason: HOSPADM

## 2024-06-06 RX ORDER — FENTANYL CITRATE 50 UG/ML
INJECTION, SOLUTION INTRAMUSCULAR; INTRAVENOUS PRN
Status: DISCONTINUED | OUTPATIENT
Start: 2024-06-06 | End: 2024-06-06 | Stop reason: SDUPTHER

## 2024-06-06 RX ORDER — SODIUM CHLORIDE 9 MG/ML
INJECTION, SOLUTION INTRAVENOUS PRN
Status: DISCONTINUED | OUTPATIENT
Start: 2024-06-06 | End: 2024-06-06 | Stop reason: HOSPADM

## 2024-06-06 RX ORDER — HYDROCODONE BITARTRATE AND ACETAMINOPHEN 5; 325 MG/1; MG/1
1 TABLET ORAL EVERY 6 HOURS PRN
Qty: 20 TABLET | Refills: 0 | Status: SHIPPED | OUTPATIENT
Start: 2024-06-06 | End: 2024-06-11

## 2024-06-06 RX ORDER — ACETAMINOPHEN 500 MG
1000 TABLET ORAL ONCE
Status: COMPLETED | OUTPATIENT
Start: 2024-06-06 | End: 2024-06-06

## 2024-06-06 RX ORDER — KETOROLAC TROMETHAMINE 30 MG/ML
INJECTION, SOLUTION INTRAMUSCULAR; INTRAVENOUS PRN
Status: DISCONTINUED | OUTPATIENT
Start: 2024-06-06 | End: 2024-06-06 | Stop reason: SDUPTHER

## 2024-06-06 RX ORDER — DIPHENHYDRAMINE HYDROCHLORIDE 50 MG/ML
12.5 INJECTION INTRAMUSCULAR; INTRAVENOUS
Status: DISCONTINUED | OUTPATIENT
Start: 2024-06-06 | End: 2024-06-06 | Stop reason: HOSPADM

## 2024-06-06 RX ORDER — SODIUM CHLORIDE 0.9 % (FLUSH) 0.9 %
5-40 SYRINGE (ML) INJECTION EVERY 12 HOURS SCHEDULED
Status: DISCONTINUED | OUTPATIENT
Start: 2024-06-06 | End: 2024-06-06 | Stop reason: HOSPADM

## 2024-06-06 RX ORDER — GABAPENTIN 300 MG/1
300 CAPSULE ORAL ONCE
Status: COMPLETED | OUTPATIENT
Start: 2024-06-06 | End: 2024-06-06

## 2024-06-06 RX ORDER — ONDANSETRON 2 MG/ML
INJECTION INTRAMUSCULAR; INTRAVENOUS PRN
Status: DISCONTINUED | OUTPATIENT
Start: 2024-06-06 | End: 2024-06-06 | Stop reason: SDUPTHER

## 2024-06-06 RX ORDER — LIDOCAINE HYDROCHLORIDE 20 MG/ML
INJECTION, SOLUTION EPIDURAL; INFILTRATION; INTRACAUDAL; PERINEURAL PRN
Status: DISCONTINUED | OUTPATIENT
Start: 2024-06-06 | End: 2024-06-06 | Stop reason: SDUPTHER

## 2024-06-06 RX ORDER — SODIUM CHLORIDE 0.9 % (FLUSH) 0.9 %
5-40 SYRINGE (ML) INJECTION PRN
Status: DISCONTINUED | OUTPATIENT
Start: 2024-06-06 | End: 2024-06-06 | Stop reason: HOSPADM

## 2024-06-06 RX ORDER — PROPOFOL 10 MG/ML
INJECTION, EMULSION INTRAVENOUS PRN
Status: DISCONTINUED | OUTPATIENT
Start: 2024-06-06 | End: 2024-06-06 | Stop reason: SDUPTHER

## 2024-06-06 RX ORDER — ROPIVACAINE HYDROCHLORIDE 5 MG/ML
INJECTION, SOLUTION EPIDURAL; INFILTRATION; PERINEURAL PRN
Status: DISCONTINUED | OUTPATIENT
Start: 2024-06-06 | End: 2024-06-06 | Stop reason: ALTCHOICE

## 2024-06-06 RX ORDER — FENTANYL CITRATE 0.05 MG/ML
50 INJECTION, SOLUTION INTRAMUSCULAR; INTRAVENOUS EVERY 5 MIN PRN
Status: DISCONTINUED | OUTPATIENT
Start: 2024-06-06 | End: 2024-06-06 | Stop reason: HOSPADM

## 2024-06-06 RX ORDER — DEXAMETHASONE SODIUM PHOSPHATE 4 MG/ML
INJECTION, SOLUTION INTRA-ARTICULAR; INTRALESIONAL; INTRAMUSCULAR; INTRAVENOUS; SOFT TISSUE PRN
Status: DISCONTINUED | OUTPATIENT
Start: 2024-06-06 | End: 2024-06-06 | Stop reason: SDUPTHER

## 2024-06-06 RX ORDER — LIDOCAINE HYDROCHLORIDE 10 MG/ML
1 INJECTION, SOLUTION EPIDURAL; INFILTRATION; INTRACAUDAL; PERINEURAL
Status: COMPLETED | OUTPATIENT
Start: 2024-06-06 | End: 2024-06-06

## 2024-06-06 RX ADMIN — DEXAMETHASONE SODIUM PHOSPHATE 8 MG: 4 INJECTION, SOLUTION INTRAMUSCULAR; INTRAVENOUS at 08:45

## 2024-06-06 RX ADMIN — PROPOFOL 200 MG: 10 INJECTION, EMULSION INTRAVENOUS at 08:42

## 2024-06-06 RX ADMIN — SODIUM CHLORIDE: 9 INJECTION, SOLUTION INTRAVENOUS at 08:10

## 2024-06-06 RX ADMIN — FENTANYL CITRATE 50 MCG: 0.05 INJECTION, SOLUTION INTRAMUSCULAR; INTRAVENOUS at 09:43

## 2024-06-06 RX ADMIN — GABAPENTIN 300 MG: 300 CAPSULE ORAL at 08:02

## 2024-06-06 RX ADMIN — ONDANSETRON 4 MG: 2 INJECTION INTRAMUSCULAR; INTRAVENOUS at 09:48

## 2024-06-06 RX ADMIN — ACETAMINOPHEN 1000 MG: 500 TABLET ORAL at 08:02

## 2024-06-06 RX ADMIN — KETOROLAC TROMETHAMINE 30 MG: 30 INJECTION, SOLUTION INTRAMUSCULAR at 09:08

## 2024-06-06 RX ADMIN — FENTANYL CITRATE 25 MCG: 50 INJECTION, SOLUTION INTRAMUSCULAR; INTRAVENOUS at 08:57

## 2024-06-06 RX ADMIN — LIDOCAINE HYDROCHLORIDE 80 MG: 20 INJECTION, SOLUTION EPIDURAL; INFILTRATION; INTRACAUDAL; PERINEURAL at 08:41

## 2024-06-06 RX ADMIN — ONDANSETRON 4 MG: 2 INJECTION INTRAMUSCULAR; INTRAVENOUS at 08:37

## 2024-06-06 RX ADMIN — LIDOCAINE HYDROCHLORIDE 1 ML: 10 INJECTION, SOLUTION EPIDURAL; INFILTRATION; INTRACAUDAL; PERINEURAL at 08:02

## 2024-06-06 ASSESSMENT — ENCOUNTER SYMPTOMS: SHORTNESS OF BREATH: 1

## 2024-06-06 ASSESSMENT — PAIN DESCRIPTION - ORIENTATION
ORIENTATION: LEFT
ORIENTATION: LEFT

## 2024-06-06 ASSESSMENT — PAIN DESCRIPTION - DESCRIPTORS
DESCRIPTORS: ACHING;DULL
DESCRIPTORS: ACHING;DULL
DESCRIPTORS: BURNING

## 2024-06-06 ASSESSMENT — PAIN DESCRIPTION - PAIN TYPE
TYPE: SURGICAL PAIN
TYPE: SURGICAL PAIN

## 2024-06-06 ASSESSMENT — PAIN - FUNCTIONAL ASSESSMENT
PAIN_FUNCTIONAL_ASSESSMENT: NONE - DENIES PAIN
PAIN_FUNCTIONAL_ASSESSMENT: 0-10
PAIN_FUNCTIONAL_ASSESSMENT: 0-10

## 2024-06-06 ASSESSMENT — PAIN SCALES - GENERAL
PAINLEVEL_OUTOF10: 2
PAINLEVEL_OUTOF10: 5

## 2024-06-06 ASSESSMENT — PAIN DESCRIPTION - LOCATION
LOCATION: KNEE
LOCATION: KNEE

## 2024-06-06 NOTE — INTERVAL H&P NOTE
Update History & Physical    The patient's History and Physical of May 23, 2024 was reviewed with the patient and I examined the patient. There was no change. Here today for KNEE ARTHROSCOPY PARTIAL MEDIAL MENISCECTOMY - Left per Dr. Cerrato.    Pt AAO x 3 in NAD. HRRR. No adventitious lung sounds. No respiratory distress. NPO p MN. Took no medications this am. Stopped aspirin, vitamins/supplements two weeks ago. Denies taking any other blood thinning medications. Denies recent or current chest pain/pressure, palpitations, SOB, recent URI, fever or chills. Review vitals per RN flowsheet.       Electronically signed by NAYA Wyatt CNP on 6/6/2024 at 7:16 AM

## 2024-06-06 NOTE — DISCHARGE INSTRUCTIONS
Knee Arthroscopy: What to Expect at Home  Your Recovery     Arthroscopy is a way to find problems and do surgery inside a joint without making a large cut (incision). Your doctor put a lighted tube with a tiny camera--called an arthroscope, or scope--and surgical tools through small incisions in your knee.  You will feel tired for several days. Your knee will be swollen. And you may notice that your skin is a different color near the cuts (incisions). The swelling is normal and will start to go away in a few days. Keeping your leg higher than your heart will help with swelling and pain.  You will probably need about 6 weeks to recover. If your doctor repaired damaged tissue, recovery will take longer. You may have to limit your activity until your knee strength and movement are back to normal. You may also be in a physical rehabilitation (rehab) program.  You may be able to return to a desk job or your normal routine in a few days. But if you do physical labor, it may be a few weeks to a few months before you can go back to work.  This care sheet gives you a general idea about how long it will take for you to recover. But each person recovers at a different pace. Follow the steps below to get better as quickly as possible.  How can you care for yourself at home?  Activity    Rest when you feel tired. Getting enough sleep will help you recover. Use pillows to raise your ankle and leg above the level of your heart.     Try to walk each day, after your doctor has said you can. Start by walking a little more than you did the day before. Bit by bit, increase the amount you walk. Walking boosts blood flow and helps prevent pneumonia and constipation.     You may have a brace or crutches or both.     Your doctor will tell you how often and how much you can move your leg and knee.     If you have a desk job, you may be able to return to work a few days after the surgery. If you lift things or stand or walk a lot at

## 2024-06-06 NOTE — PROGRESS NOTES
CLINICAL PHARMACY NOTE: MEDS TO BEDS    Total # of Prescriptions Filled: 1   The following medications were delivered to the patient:  Hydrocodone/APAP 5-325MG Tablets     Additional Documentation:  Picked up at the pharmacy by the PT at 11:04AM 6/6/24

## 2024-06-06 NOTE — ANESTHESIA PRE PROCEDURE
Department of Anesthesiology  Preprocedure Note       Name:  Lissette Griffiths   Age:  53 y.o.  :  1970                                          MRN:  [id number]         Date:  2024      Surgeon: Surgeon(s):  Dean Cerrato MD    Procedure: Procedure(s):  KNEE ARTHROSCOPY PARTIAL MEDIAL MENISCECTOMY    Medications prior to admission:   Prior to Admission medications    Medication Sig Start Date End Date Taking? Authorizing Provider   HYDROcodone-acetaminophen (NORCO) 5-325 MG per tablet Take 1 tablet by mouth every 6 hours as needed for Pain for up to 5 days. Intended supply: 5 days. Take lowest dose possible to manage pain Max Daily Amount: 4 tablets 24 Yes Dean Cerrato MD   zolpidem (AMBIEN) 10 MG tablet 1 tablet at bedtime as needed Orally Once a day for 30 days 24   Rupinder Shelton MD   tiZANidine (ZANAFLEX) 2 MG capsule Take 1 capsule by mouth 3 times daily as needed    Rupinder Shelton MD   ondansetron (ZOFRAN-ODT) 8 MG TBDP disintegrating tablet     Rupinder Shelton MD   fluticasone (FLONASE) 50 MCG/ACT nasal spray INSTILL 1 SPRAY IN EACH NOSTRIL ONE TIME A DAY 23   Rupinder Shelton MD   QULIPTA 60 MG TABS Take 1 tablet by mouth at bedtime 10/30/23   Rupinder Shelton MD   fexofenadine (ALLEGRA) 180 MG tablet Take 1 tablet by mouth daily    Rupinder Shelton MD   traZODone (DESYREL) 100 MG tablet 1.5 tablets nightly 23   Rupinder Shelton MD   diclofenac sodium (VOLTAREN) 1 % GEL Apply 4 g topically 4 times daily as needed for Pain  Patient taking differently: Apply 4 g topically daily 23   Buddy Titus MD   Semaglutide, 2 MG/DOSE, 8 MG/3ML SOPN Inject 2 mg into the skin every 7 days     Rupinder Shelton MD   rOPINIRole (REQUIP XL) 2 MG extended release tablet Take 1 tablet by mouth in the morning and 1 tablet in the evening. 22   Rupinder Shelton MD   insulin regular human (HUMULIN R U-500 KWIKPEN) 500

## 2024-06-06 NOTE — OP NOTE
femoral joint was examined and noted to have several areas of exposed bone on the lateral facet of the patella. The scope was then passes down the medial gutter into the medial compartment. A probe was used to assess the medial meniscus and a tear was identified in the posterior horn and into the root ligament of the medial meniscus. A partial medial menisectomy was carried ou with basket forceps and then smoothed out with a shaver. Repeat probing of the meniscus found it to be stable. There was no significant cartilage damage or wear.     The arthroscope was then passed into the notch of the knee. The ACL was found not to have any significant damage or laxity.     The scope was then passed in the lateral compartment. Thorough probing of the lateral meniscus and the articular cartilage did not demonstrate any significant finding that requires surgical intervention    The scope was then passed into the suprapatellar area and the shaver was used to remove any further soft tissue debris. The scope was removed and the knee evacuated of fluid and injected with 20cc of 0.5% ropivacaine. The sterile bulky dressing was applied and the leg then wrapped with a large 6-inch ace bandage from toes to the mid-thigh. The tourniquet was then deflated at less than 30 minutes. The patient was awaken and taken to the PACU in good condition.     Electronically signed by Dean Cerrato MD on 6/6/2024 at 9:13 AM

## 2024-06-06 NOTE — ANESTHESIA POSTPROCEDURE EVALUATION
Department of Anesthesiology  Postprocedure Note    Patient: Lissette Griffiths  MRN: [id number]  YOB: 1970  Date of evaluation: 6/6/2024    Procedure Summary       Date: 06/06/24 Room / Location: [address]    Anesthesia Start: 0836 Anesthesia Stop: 0923    Procedure: KNEE ARTHROSCOPY PARTIAL MEDIAL MENISCECTOMY (Left: Knee) Diagnosis:       Tear of medial meniscus of left knee, current, unspecified tear type, initial encounter      (Tear of medial meniscus of left knee, current, unspecified tear type, initial encounter [S83.242A])    Surgeons: Dean Cerrato MD Responsible Provider: Ayse Abdalla MD    Anesthesia Type: General ASA Status: 3            Anesthesia Type: General    Rashad Phase I: Rashad Score: 10    Rashad Phase II:      Anesthesia Post Evaluation    Comments: POST- ANESTHESIA EVALUATION       Pt Name: Lissette Griffiths  MRN: [id number]  YOB: 1970  Date of evaluation: 6/6/2024  Time:  11:05 AM      /63   Pulse 80   Temp 96.9 °F (36.1 °C) (Infrared)   Resp 16   Ht 1.651 m (5' 5\")   Wt 105.2 kg (232 lb)   LMP 01/01/2024 Comment: UCG neg preop 6/6/24  SpO2 94%   BMI 38.61 kg/m²      Consciousness Level  Awake  Cardiopulmonary Status  Stable  Pain Adequately Treated YES  Nausea / Vomiting  NO  Adequate Hydration  YES  Anesthesia Related Complications NONE      Electronically signed by Ayse Abdalla MD on 6/6/2024 at 11:05 AM           No notable events documented.

## 2024-06-21 ENCOUNTER — OFFICE VISIT (OUTPATIENT)
Dept: ORTHOPEDIC SURGERY | Age: 54
End: 2024-06-21

## 2024-06-21 DIAGNOSIS — Z98.890 S/P LEFT KNEE ARTHROSCOPY: Primary | ICD-10-CM

## 2024-06-21 PROCEDURE — 99024 POSTOP FOLLOW-UP VISIT: CPT | Performed by: ORTHOPAEDIC SURGERY

## 2024-06-21 NOTE — PROGRESS NOTES
Patient returns today status post left knee arthroscopy with partial (medial/lateral) meniscectomy. Patient has no major complaints other than expected tightness/swelling with ROM. Sharp/stabbing pain has improved.     On exam, portal sites are without redness or drainage. No calf tenderness; negative Sulema's sign. Motion is 0-100 degrees. No significant effusion.     Assessment  Status post left knee arthroscopy    Plan  Patient given exercises to perform. Patient given activities/ motions to complete. Continue activities at home. Return to work. RTO PRN. Call with any future problems.

## 2024-06-22 ENCOUNTER — HOSPITAL ENCOUNTER (EMERGENCY)
Age: 54
Discharge: HOME | End: 2024-06-22
Attending: EMERGENCY MEDICINE
Payer: COMMERCIAL

## 2024-06-22 VITALS
BODY MASS INDEX: 37.99 KG/M2 | DIASTOLIC BLOOD PRESSURE: 68 MMHG | WEIGHT: 228 LBS | HEART RATE: 90 BPM | OXYGEN SATURATION: 98 % | HEIGHT: 65 IN | SYSTOLIC BLOOD PRESSURE: 110 MMHG | TEMPERATURE: 98 F | RESPIRATION RATE: 18 BRPM

## 2024-06-22 DIAGNOSIS — R19.7 DIARRHEA, UNSPECIFIED TYPE: ICD-10-CM

## 2024-06-22 DIAGNOSIS — R11.2 NAUSEA AND VOMITING, UNSPECIFIED VOMITING TYPE: Primary | ICD-10-CM

## 2024-06-22 LAB
ALBUMIN SERPL-MCNC: 4.4 G/DL (ref 3.5–5.2)
ALP SERPL-CCNC: 102 U/L (ref 35–104)
ALT SERPL-CCNC: 33 U/L (ref 5–33)
AMORPH SED URNS QL MICRO: ABNORMAL
ANION GAP SERPL CALCULATED.3IONS-SCNC: 15 MMOL/L (ref 9–17)
AST SERPL-CCNC: 32 U/L
BASOPHILS # BLD: 0.07 K/UL (ref 0–0.2)
BASOPHILS NFR BLD: 1 % (ref 0–2)
BILIRUB DIRECT SERPL-MCNC: 0.1 MG/DL
BILIRUB INDIRECT SERPL-MCNC: 0.3 MG/DL (ref 0–1)
BILIRUB SERPL-MCNC: 0.4 MG/DL (ref 0.3–1.2)
BILIRUB UR QL STRIP: NEGATIVE
BUN SERPL-MCNC: 19 MG/DL (ref 6–20)
BUN/CREAT SERPL: 17 (ref 9–20)
CALCIUM SERPL-MCNC: 10.5 MG/DL (ref 8.6–10.4)
CHLORIDE SERPL-SCNC: 96 MMOL/L (ref 98–107)
CLARITY UR: ABNORMAL
CO2 SERPL-SCNC: 26 MMOL/L (ref 20–31)
COLOR UR: YELLOW
CREAT SERPL-MCNC: 1.1 MG/DL (ref 0.5–0.9)
EOSINOPHIL # BLD: 0.13 K/UL (ref 0–0.44)
EOSINOPHILS RELATIVE PERCENT: 1 % (ref 1–4)
EPI CELLS #/AREA URNS HPF: ABNORMAL /HPF (ref 0–5)
ERYTHROCYTE [DISTWIDTH] IN BLOOD BY AUTOMATED COUNT: 16.3 % (ref 11.8–14.4)
GFR, ESTIMATED: 60 ML/MIN/1.73M2
GLUCOSE SERPL-MCNC: 105 MG/DL (ref 70–99)
GLUCOSE UR STRIP-MCNC: NEGATIVE MG/DL
HCT VFR BLD AUTO: 46.1 % (ref 36.3–47.1)
HGB BLD-MCNC: 14.1 G/DL (ref 11.9–15.1)
HGB UR QL STRIP.AUTO: NEGATIVE
IMM GRANULOCYTES # BLD AUTO: 0.05 K/UL (ref 0–0.3)
IMM GRANULOCYTES NFR BLD: 0 %
KETONES UR STRIP-MCNC: ABNORMAL MG/DL
LACTATE BLDV-SCNC: 2.5 MMOL/L (ref 0.5–1.9)
LEUKOCYTE ESTERASE UR QL STRIP: ABNORMAL
LIPASE SERPL-CCNC: 70 U/L (ref 13–60)
LYMPHOCYTES NFR BLD: 2.5 K/UL (ref 1.1–3.7)
LYMPHOCYTES RELATIVE PERCENT: 21 % (ref 24–43)
MAGNESIUM SERPL-MCNC: 2 MG/DL (ref 1.6–2.6)
MCH RBC QN AUTO: 25.5 PG (ref 25.2–33.5)
MCHC RBC AUTO-ENTMCNC: 30.6 G/DL (ref 28.4–34.8)
MCV RBC AUTO: 83.2 FL (ref 82.6–102.9)
MONOCYTES NFR BLD: 0.99 K/UL (ref 0.1–1.2)
MONOCYTES NFR BLD: 8 % (ref 3–12)
NEUTROPHILS NFR BLD: 69 % (ref 36–65)
NEUTS SEG NFR BLD: 8.46 K/UL (ref 1.5–8.1)
NITRITE UR QL STRIP: NEGATIVE
NRBC BLD-RTO: 0 PER 100 WBC
PH UR STRIP: 6 [PH] (ref 5–8)
PLATELET # BLD AUTO: 283 K/UL (ref 138–453)
PMV BLD AUTO: 9.6 FL (ref 8.1–13.5)
POTASSIUM SERPL-SCNC: 4.7 MMOL/L (ref 3.7–5.3)
PROT SERPL-MCNC: 7.8 G/DL (ref 6.4–8.3)
PROT UR STRIP-MCNC: NEGATIVE MG/DL
RBC # BLD AUTO: 5.54 M/UL (ref 3.95–5.11)
RBC # BLD: ABNORMAL 10*6/UL
RBC #/AREA URNS HPF: ABNORMAL /HPF (ref 0–2)
SODIUM SERPL-SCNC: 137 MMOL/L (ref 135–144)
SP GR UR STRIP: 1.01 (ref 1–1.03)
UROBILINOGEN UR STRIP-ACNC: NORMAL EU/DL (ref 0–1)
WBC #/AREA URNS HPF: ABNORMAL /HPF (ref 0–5)
WBC OTHER # BLD: 12.2 K/UL (ref 3.5–11.3)

## 2024-06-22 PROCEDURE — 83690 ASSAY OF LIPASE: CPT

## 2024-06-22 PROCEDURE — 81001 URINALYSIS AUTO W/SCOPE: CPT

## 2024-06-22 PROCEDURE — 83605 ASSAY OF LACTIC ACID: CPT

## 2024-06-22 PROCEDURE — 85025 COMPLETE CBC W/AUTO DIFF WBC: CPT

## 2024-06-22 PROCEDURE — 99284 EMERGENCY DEPT VISIT MOD MDM: CPT

## 2024-06-22 PROCEDURE — 96374 THER/PROPH/DIAG INJ IV PUSH: CPT

## 2024-06-22 PROCEDURE — 2580000003 HC RX 258: Performed by: EMERGENCY MEDICINE

## 2024-06-22 PROCEDURE — 6360000002 HC RX W HCPCS: Performed by: EMERGENCY MEDICINE

## 2024-06-22 PROCEDURE — 80048 BASIC METABOLIC PNL TOTAL CA: CPT

## 2024-06-22 PROCEDURE — 83735 ASSAY OF MAGNESIUM: CPT

## 2024-06-22 PROCEDURE — 80076 HEPATIC FUNCTION PANEL: CPT

## 2024-06-22 RX ORDER — ONDANSETRON 2 MG/ML
4 INJECTION INTRAMUSCULAR; INTRAVENOUS ONCE
Status: COMPLETED | OUTPATIENT
Start: 2024-06-22 | End: 2024-06-22

## 2024-06-22 RX ORDER — PROMETHAZINE HYDROCHLORIDE 25 MG/1
25 TABLET ORAL EVERY 6 HOURS PRN
Qty: 20 TABLET | Refills: 0 | Status: SHIPPED | OUTPATIENT
Start: 2024-06-22 | End: 2024-06-29

## 2024-06-22 RX ORDER — 0.9 % SODIUM CHLORIDE 0.9 %
1000 INTRAVENOUS SOLUTION INTRAVENOUS ONCE
Status: COMPLETED | OUTPATIENT
Start: 2024-06-22 | End: 2024-06-22

## 2024-06-22 RX ADMIN — ONDANSETRON 4 MG: 2 INJECTION INTRAMUSCULAR; INTRAVENOUS at 10:03

## 2024-06-22 RX ADMIN — SODIUM CHLORIDE 1000 ML: 9 INJECTION, SOLUTION INTRAVENOUS at 10:00

## 2024-06-22 ASSESSMENT — ENCOUNTER SYMPTOMS
VOMITING: 0
COLOR CHANGE: 0
COUGH: 0
SHORTNESS OF BREATH: 0
ABDOMINAL PAIN: 0
EYE REDNESS: 0
EYE DISCHARGE: 0
DIARRHEA: 1
RHINORRHEA: 0
NAUSEA: 1
SORE THROAT: 0

## 2024-06-22 ASSESSMENT — PAIN - FUNCTIONAL ASSESSMENT: PAIN_FUNCTIONAL_ASSESSMENT: NONE - DENIES PAIN

## 2024-06-22 NOTE — FLOWSHEET NOTE
Pt arrives ambulatory with c/o nausea for the pat 5b days pt relates pain to new med quelbree (sp) pt has stopped med but intermittent nausea persists upon exam pt alert and in nad resp non labored

## 2024-06-22 NOTE — DISCHARGE INSTR - COC
Continuity of Care Form    Patient Name: Lissette Griffiths   :  1970  MRN:  [id number]    Admit date:  2024  Discharge date:  ***    Code Status Order: No Order   Advance Directives:     Admitting Physician:  No admitting provider for patient encounter.  PCP: Enrico Reynoso MD    Discharging Nurse: ***  Discharging Hospital Unit/Room#: STA15/15  Discharging Unit Phone Number: ***    Emergency Contact:   Extended Emergency Contact Information  Primary Emergency Contact: Harrison Olivares  Address: [address] [place] [address]  Home Phone: [phone]  Relation: Niece/Nephew    Past Surgical History:  Past Surgical History:   Procedure Laterality Date    BACK SURGERY      discectomy    CARPAL TUNNEL RELEASE Right 02/10/2022    CARPAL TUNNEL RELEASE Left 2023    with trigger finger release    COLONOSCOPY      ESOPHAGOGASTRODUODENOSCOPY      KNEE ARTHROSCOPY Right 2023    KNEE ARTHROSCOPY PARTIAL MEDIAL MENISCECTOMY performed by Dean Cerrato MD at [facility] OR    KNEE ARTHROSCOPY Left 2024    KNEE ARTHROSCOPY PARTIAL MEDIAL MENISCECTOMY performed by Dean Cerrato MD at [facility] OR    LIVER BIOPSY      SKIN BIOPSY      ears    WISDOM TOOTH EXTRACTION         Immunization History:   Immunization History   Administered Date(s) Administered    COVID-19, MODERNA, (- formula), (age 12y+), IM, 50mcg/0.5mL 2024    COVID-19, PFIZER Bivalent, DO NOT Dilute, (age 12y+), IM, 30 mcg/0.3 mL 2022       Active Problems:  Patient Active Problem List   Diagnosis Code    Dyspnea on exertion R06.09    Essential hypertension I10    Gastroesophageal reflux disease without esophagitis K21.9    Generalized anxiety disorder F41.1    Hyperlipidemia E78.5    Mixed hyperlipidemia E78.2    Onychomycosis B35.1    Primary insomnia F51.01    Right bundle branch block I45.10    Seasonal allergic rhinitis J30.2    Sinus tachycardia R00.0    Type 2 diabetes mellitus without

## 2024-06-22 NOTE — ED PROVIDER NOTES
EMERGENCY DEPARTMENT ENCOUNTER    Pt Name: Lissette Griffiths  MRN: [id number]  Birthdate 1970  Date of evaluation: 6/22/24  CHIEF COMPLAINT       Chief Complaint   Patient presents with    Emesis     Sts for 5 days  started new med qelbree same day  stopped med on thursday     HISTORY OF PRESENT ILLNESS   53-year-old presents with complaints of nausea, diarrhea.  Patient began having some nausea few days ago followed by loose watery diarrhea yesterday and the day before.  She describes her symptoms as severe, without any specific aggravating relieving factors.  Mild crampy abdominal pain when she has to have a bowel movement.           REVIEW OF SYSTEMS     Review of Systems   Constitutional:  Negative for chills and fever.   HENT:  Negative for rhinorrhea and sore throat.    Eyes:  Negative for discharge, redness and visual disturbance.   Respiratory:  Negative for cough and shortness of breath.    Cardiovascular:  Negative for chest pain, palpitations and leg swelling.   Gastrointestinal:  Positive for diarrhea and nausea. Negative for abdominal pain and vomiting.   Genitourinary:  Negative for dysuria and hematuria.   Musculoskeletal:  Negative for arthralgias, myalgias and neck pain.   Skin:  Negative for color change and rash.   Neurological:  Negative for seizures, weakness and headaches.   Psychiatric/Behavioral:  Negative for hallucinations, self-injury and suicidal ideas.      PASTMEDICAL HISTORY     Past Medical History:   Diagnosis Date    Acid reflux     Anxiety     Asthma     Diabetes mellitus (HCC)     History of sleep walking     Hyperlipidemia     Hypertension     Migraine     STUART (nonalcoholic steatohepatitis)     Sleep apnea     uses bipap    Tachycardia     Thrush      Past Problem List  Patient Active Problem List   Diagnosis Code    Dyspnea on exertion R06.09    Essential hypertension I10    Gastroesophageal reflux disease without esophagitis K21.9    Generalized anxiety disorder F41.1

## 2024-07-29 RX ADMIN — LIDOCAINE HYDROCHLORIDE 0.5 ML: 10 INJECTION, SOLUTION INFILTRATION; PERINEURAL at 15:51

## 2024-07-30 ENCOUNTER — OFFICE VISIT (OUTPATIENT)
Age: 54
End: 2024-07-30

## 2024-07-30 VITALS — BODY MASS INDEX: 37.99 KG/M2 | WEIGHT: 228 LBS | HEIGHT: 65 IN

## 2024-07-30 DIAGNOSIS — M77.8 RIGHT ELBOW TENDONITIS: ICD-10-CM

## 2024-07-30 DIAGNOSIS — M77.11 LATERAL EPICONDYLITIS, RIGHT ELBOW: Primary | ICD-10-CM

## 2024-07-30 RX ORDER — METHYLPREDNISOLONE ACETATE 80 MG/ML
40 INJECTION, SUSPENSION INTRA-ARTICULAR; INTRALESIONAL; INTRAMUSCULAR; SOFT TISSUE ONCE
Status: COMPLETED | OUTPATIENT
Start: 2024-07-30 | End: 2024-07-30

## 2024-07-30 RX ORDER — LIDOCAINE HYDROCHLORIDE 10 MG/ML
0.5 INJECTION, SOLUTION INFILTRATION; PERINEURAL ONCE
Status: COMPLETED | OUTPATIENT
Start: 2024-07-30 | End: 2024-07-29

## 2024-07-30 RX ADMIN — METHYLPREDNISOLONE ACETATE 40 MG: 80 INJECTION, SUSPENSION INTRA-ARTICULAR; INTRALESIONAL; INTRAMUSCULAR; SOFT TISSUE at 15:52

## 2024-07-30 NOTE — PROGRESS NOTES
[facility]      [facility] PHYSIC[facility]ANS [facility]  [facility]  [address]  [place] [address]  Dept: [phone]  Dept Fax: [phone]    Chief Compliant:  Chief Complaint   Patient presents with    Elbow Pain     Right elbow        History of Present Illness:  This is a pleasant 53 y.o. female who is here for evaluation of right elbow pain. States this has been on and off for the past 2 years but is now flared up again. States she had been wearing a tennis elbow brace which helped but now it not helping. States pain is mainly when lifting with the right arm. Pain is localized to the outside of the elbow. Denies any numbness or tingling. Denies any recent injuries or falls.     Physical Exam: Right elbow: Skin intact. Good range of motion of the elbow without pain. TTP over lateral epicondyle. Lateral elbow pain with resisted wrist extension and resisted middle finger extension.     Imaging: None      Assessment and Plan:    This is a pleasant 53 y.o. female who has right elbow lateral epicondylitis. Discussed etiology of symptoms and treatment options. Offered cortisone injection today for acute pain relief followed by continue use of brace and provided patient with tennis elbow exercises. Verbal consent was obtained. After the skin was cleansed with alcohol I injected 40 mg of Depo-Medrol and local anesthetic into the right elbow lateral epicondyle fenestrating the ECRB tendon.  Cortisone injection risks include infection, hyperglycemia, post injection pain. Patient tolerated procedure well. Ice, NSAIDs/Tylenol for pain. Patient may follow up as needed.            Past History:    Current Outpatient Medications:     zolpidem (AMBIEN) 10 MG tablet, 1 tablet at bedtime as needed Orally Once a day for 30 days, Disp: , Rfl:     tiZANidine (ZANAFLEX) 2 MG capsule, Take 1 capsule by mouth 3 times daily as needed,

## 2024-11-07 ENCOUNTER — PREP FOR PROCEDURE (OUTPATIENT)
Age: 54
End: 2024-11-07

## 2024-11-07 ENCOUNTER — OFFICE VISIT (OUTPATIENT)
Age: 54
End: 2024-11-07
Payer: COMMERCIAL

## 2024-11-07 ENCOUNTER — TELEPHONE (OUTPATIENT)
Age: 54
End: 2024-11-07

## 2024-11-07 DIAGNOSIS — M65.332 ACQUIRED TRIGGER FINGER OF LEFT MIDDLE FINGER: ICD-10-CM

## 2024-11-07 DIAGNOSIS — M65.949 FLEXOR TENOSYNOVITIS OF FINGER: Primary | ICD-10-CM

## 2024-11-07 DIAGNOSIS — M65.331 ACQUIRED TRIGGER FINGER OF RIGHT MIDDLE FINGER: ICD-10-CM

## 2024-11-07 PROCEDURE — 99213 OFFICE O/P EST LOW 20 MIN: CPT | Performed by: NURSE PRACTITIONER

## 2024-11-07 RX ORDER — METHYLPREDNISOLONE 4 MG/1
TABLET ORAL
Qty: 1 KIT | Refills: 0 | Status: SHIPPED | OUTPATIENT
Start: 2024-11-07 | End: 2024-11-13

## 2024-11-07 NOTE — PROGRESS NOTES
needed, Disp: , Rfl:     ONE TOUCH ULTRA TEST strip, , Disp: , Rfl:     lisinopril (PRINIVIL;ZESTRIL) 10 MG tablet, Take 1 tablet by mouth daily, Disp: , Rfl:     aspirin 81 MG tablet, Take 1 tablet by mouth daily, Disp: , Rfl:     atorvastatin (LIPITOR) 20 MG tablet, , Disp: , Rfl:     metoprolol succinate (TOPROL XL) 100 MG extended release tablet, 1 tablet at bedtime, Disp: , Rfl:     traMADol (ULTRAM) 50 MG tablet, 1 tablet every 6 hours as needed. migraines, Disp: , Rfl:   Allergies   Allergen Reactions    Actos [Pioglitazone] Shortness Of Breath    Canagliflozin Other (See Comments)    Other      Other reaction(s): Flushing    Reglan  [Metoclopramide]      Other reaction(s): Unknown    Tuberculin Ppd Other (See Comments)    Adhesive Tape Rash     Social History     Socioeconomic History    Marital status:      Spouse name: Not on file    Number of children: Not on file    Years of education: Not on file    Highest education level: Not on file   Occupational History    Not on file   Tobacco Use    Smoking status: Never     Passive exposure: Never    Smokeless tobacco: Never   Vaping Use    Vaping status: Never Used   Substance and Sexual Activity    Alcohol use: Yes     Comment: rare    Drug use: No    Sexual activity: Not on file   Other Topics Concern    Not on file   Social History Narrative    Not on file     Social Determinants of Health     Financial Resource Strain: Low Risk  (2/14/2023)    [facility]    Overall Financial Resource Strain (CARDIA)     Difficulty of Paying Living Expenses: Not very hard   Food Insecurity: No Food Insecurity (9/17/2024)    Received from [facility]    Hunger Screening     Within the past 12 months we worried whether our food would run out before we got money to buy more.: Never True     Within the past 12 months the food we bought just didn't last and we didn't have money to get more.: Never True

## 2024-11-07 NOTE — TELEPHONE ENCOUNTER
I read that to say if she wasn't getting relief then A1 pulley release. I think it manages her pain but yes she just hit 3 months since last injection.

## 2024-11-07 NOTE — TELEPHONE ENCOUNTER
Patient called and her appointment for bilateral steroid injections was moved up at her request due to the pain in her hands is so severe. Patient asking If an oral steroid could be ordered to get her through to her appointment

## 2024-11-18 RX ORDER — MODAFINIL 100 MG/1
200 TABLET ORAL DAILY
COMMUNITY

## 2024-11-18 RX ORDER — FREMANEZUMAB-VFRM 225 MG/1.5ML
INJECTION SUBCUTANEOUS
COMMUNITY

## 2024-11-18 NOTE — PROGRESS NOTES
DAY OF SURGERY/PROCEDURE  GUIDELINES    As a patient at the [facility], you can expect quality medical and nursing care that is centered on your individual needs. It is our goal to make your surgical experience as comfortable and excellent as possible.  ________________________________________________________________________    The following instructions are general guidelines, if any information on this sheet is different from what your doctor has instructed you to do, please follow your doctor's instructions.    Please arrive on 11/22/2024 @ 0700      Enter through entrance C. Check in at registration     Upon arrival you will be taken to the pre-operative area to get ready for surgery, your family will stay in the waiting room and visit with you once you are ready for surgery. Due to special limitations please limit visitation to 1-2 members of your family at a time. When it is time for surgery your family will return to the waiting room.    Nothing to eat, drink, smoke, suck or chew after midnight (no water, gum, mints, cigarettes, cigars, pipes, snuff, chewing tobacco, etc.) or your surgery may be canceled.     Take a shower or bath on the morning of your surgery/procedure (Hibiclens if directed) Do not apply any lotions.    Brush your teeth, but do not swallow any water    IN CASE OF ILLNESS - If you have a cold or flu symptoms (high fever, runny nose, sore throat, cough, etc.) rash, nausea, vomiting, loose stools, and/or recent contact with someone who has a contagious disease (chick pox, measles, etc.) please call your doctor before coming to the surgery center    Take a small sip of water with     If applicable bring your:  Inhaler (s)  Hearing aid(s)  Eyeglasses and Case (If you wear contacts they have to be removed before surgery, bring case and solution)  CPAP     DO NOT take anticoagulants (blood thinners, aspirin or aspirin-containing products) as instructed by your

## 2024-11-21 ENCOUNTER — ANESTHESIA EVENT (OUTPATIENT)
Dept: OPERATING ROOM | Age: 54
End: 2024-11-21
Payer: COMMERCIAL

## 2024-11-22 ENCOUNTER — HOSPITAL ENCOUNTER (OUTPATIENT)
Age: 54
Setting detail: OUTPATIENT SURGERY
Discharge: HOME | End: 2024-11-22
Attending: ORTHOPAEDIC SURGERY | Admitting: ORTHOPAEDIC SURGERY
Payer: COMMERCIAL

## 2024-11-22 ENCOUNTER — ANESTHESIA (OUTPATIENT)
Dept: OPERATING ROOM | Age: 54
End: 2024-11-22
Payer: COMMERCIAL

## 2024-11-22 VITALS
HEIGHT: 66 IN | DIASTOLIC BLOOD PRESSURE: 73 MMHG | TEMPERATURE: 97.3 F | HEART RATE: 72 BPM | OXYGEN SATURATION: 97 % | WEIGHT: 232.2 LBS | BODY MASS INDEX: 37.32 KG/M2 | SYSTOLIC BLOOD PRESSURE: 163 MMHG | RESPIRATION RATE: 15 BRPM

## 2024-11-22 PROBLEM — M65.331 TRIGGER FINGER, RIGHT MIDDLE FINGER: Status: ACTIVE | Noted: 2024-11-22

## 2024-11-22 PROBLEM — M65.332 TRIGGER FINGER, LEFT MIDDLE FINGER: Status: ACTIVE | Noted: 2024-11-22

## 2024-11-22 LAB
GLUCOSE BLD-MCNC: 106 MG/DL (ref 65–105)
HCG, PREGNANCY URINE (POC): NEGATIVE

## 2024-11-22 PROCEDURE — 6360000002 HC RX W HCPCS: Performed by: ORTHOPAEDIC SURGERY

## 2024-11-22 PROCEDURE — 2580000003 HC RX 258

## 2024-11-22 PROCEDURE — 3700000001 HC ADD 15 MINUTES (ANESTHESIA): Performed by: ORTHOPAEDIC SURGERY

## 2024-11-22 PROCEDURE — 82947 ASSAY GLUCOSE BLOOD QUANT: CPT

## 2024-11-22 PROCEDURE — 3600000002 HC SURGERY LEVEL 2 BASE: Performed by: ORTHOPAEDIC SURGERY

## 2024-11-22 PROCEDURE — 3700000000 HC ANESTHESIA ATTENDED CARE: Performed by: ORTHOPAEDIC SURGERY

## 2024-11-22 PROCEDURE — 7100000011 HC PHASE II RECOVERY - ADDTL 15 MIN: Performed by: ORTHOPAEDIC SURGERY

## 2024-11-22 PROCEDURE — 3600000012 HC SURGERY LEVEL 2 ADDTL 15MIN: Performed by: ORTHOPAEDIC SURGERY

## 2024-11-22 PROCEDURE — 2580000003 HC RX 258: Performed by: STUDENT IN AN ORGANIZED HEALTH CARE EDUCATION/TRAINING PROGRAM

## 2024-11-22 PROCEDURE — 6360000002 HC RX W HCPCS

## 2024-11-22 PROCEDURE — 7100000010 HC PHASE II RECOVERY - FIRST 15 MIN: Performed by: ORTHOPAEDIC SURGERY

## 2024-11-22 PROCEDURE — 2709999900 HC NON-CHARGEABLE SUPPLY: Performed by: ORTHOPAEDIC SURGERY

## 2024-11-22 PROCEDURE — 26055 INCISE FINGER TENDON SHEATH: CPT | Performed by: ORTHOPAEDIC SURGERY

## 2024-11-22 PROCEDURE — 81025 URINE PREGNANCY TEST: CPT

## 2024-11-22 RX ORDER — SODIUM CHLORIDE 0.9 % (FLUSH) 0.9 %
5-40 SYRINGE (ML) INJECTION PRN
Status: DISCONTINUED | OUTPATIENT
Start: 2024-11-22 | End: 2024-11-22 | Stop reason: HOSPADM

## 2024-11-22 RX ORDER — PROCHLORPERAZINE EDISYLATE 5 MG/ML
5 INJECTION INTRAMUSCULAR; INTRAVENOUS
Status: DISCONTINUED | OUTPATIENT
Start: 2024-11-22 | End: 2024-11-22 | Stop reason: HOSPADM

## 2024-11-22 RX ORDER — ROPIVACAINE HYDROCHLORIDE 5 MG/ML
INJECTION, SOLUTION EPIDURAL; INFILTRATION; PERINEURAL
Status: DISCONTINUED
Start: 2024-11-22 | End: 2024-11-22 | Stop reason: HOSPADM

## 2024-11-22 RX ORDER — SODIUM CHLORIDE 9 MG/ML
INJECTION, SOLUTION INTRAVENOUS PRN
Status: DISCONTINUED | OUTPATIENT
Start: 2024-11-22 | End: 2024-11-22 | Stop reason: HOSPADM

## 2024-11-22 RX ORDER — SODIUM CHLORIDE, SODIUM LACTATE, POTASSIUM CHLORIDE, CALCIUM CHLORIDE 600; 310; 30; 20 MG/100ML; MG/100ML; MG/100ML; MG/100ML
INJECTION, SOLUTION INTRAVENOUS CONTINUOUS
Status: DISCONTINUED | OUTPATIENT
Start: 2024-11-22 | End: 2024-11-22 | Stop reason: HOSPADM

## 2024-11-22 RX ORDER — LIDOCAINE HYDROCHLORIDE 10 MG/ML
INJECTION, SOLUTION EPIDURAL; INFILTRATION; INTRACAUDAL; PERINEURAL
Status: DISCONTINUED | OUTPATIENT
Start: 2024-11-22 | End: 2024-11-22 | Stop reason: SDUPTHER

## 2024-11-22 RX ORDER — FENTANYL CITRATE 50 UG/ML
INJECTION, SOLUTION INTRAMUSCULAR; INTRAVENOUS
Status: DISCONTINUED | OUTPATIENT
Start: 2024-11-22 | End: 2024-11-22 | Stop reason: SDUPTHER

## 2024-11-22 RX ORDER — LABETALOL HYDROCHLORIDE 5 MG/ML
10 INJECTION, SOLUTION INTRAVENOUS
Status: DISCONTINUED | OUTPATIENT
Start: 2024-11-22 | End: 2024-11-22 | Stop reason: HOSPADM

## 2024-11-22 RX ORDER — ACETAMINOPHEN 500 MG
1000 TABLET ORAL EVERY 6 HOURS PRN
Qty: 30 TABLET | Refills: 0 | Status: SHIPPED | OUTPATIENT
Start: 2024-11-22

## 2024-11-22 RX ORDER — IBUPROFEN 800 MG/1
800 TABLET, FILM COATED ORAL
Qty: 30 TABLET | Refills: 0 | Status: SHIPPED | OUTPATIENT
Start: 2024-11-22 | End: 2024-12-02

## 2024-11-22 RX ORDER — LIDOCAINE HYDROCHLORIDE 10 MG/ML
1 INJECTION, SOLUTION EPIDURAL; INFILTRATION; INTRACAUDAL; PERINEURAL
Status: DISCONTINUED | OUTPATIENT
Start: 2024-11-22 | End: 2024-11-22 | Stop reason: HOSPADM

## 2024-11-22 RX ORDER — HYDRALAZINE HYDROCHLORIDE 20 MG/ML
10 INJECTION INTRAMUSCULAR; INTRAVENOUS
Status: DISCONTINUED | OUTPATIENT
Start: 2024-11-22 | End: 2024-11-22 | Stop reason: HOSPADM

## 2024-11-22 RX ORDER — PROPOFOL 10 MG/ML
INJECTION, EMULSION INTRAVENOUS
Status: DISCONTINUED | OUTPATIENT
Start: 2024-11-22 | End: 2024-11-22 | Stop reason: SDUPTHER

## 2024-11-22 RX ORDER — SODIUM CHLORIDE 0.9 % (FLUSH) 0.9 %
5-40 SYRINGE (ML) INJECTION EVERY 12 HOURS SCHEDULED
Status: DISCONTINUED | OUTPATIENT
Start: 2024-11-22 | End: 2024-11-22 | Stop reason: HOSPADM

## 2024-11-22 RX ORDER — NALOXONE HYDROCHLORIDE 0.4 MG/ML
INJECTION, SOLUTION INTRAMUSCULAR; INTRAVENOUS; SUBCUTANEOUS PRN
Status: DISCONTINUED | OUTPATIENT
Start: 2024-11-22 | End: 2024-11-22 | Stop reason: HOSPADM

## 2024-11-22 RX ORDER — ONDANSETRON 2 MG/ML
INJECTION INTRAMUSCULAR; INTRAVENOUS
Status: DISCONTINUED | OUTPATIENT
Start: 2024-11-22 | End: 2024-11-22 | Stop reason: SDUPTHER

## 2024-11-22 RX ORDER — KETOROLAC TROMETHAMINE 30 MG/ML
INJECTION, SOLUTION INTRAMUSCULAR; INTRAVENOUS
Status: DISCONTINUED | OUTPATIENT
Start: 2024-11-22 | End: 2024-11-22 | Stop reason: SDUPTHER

## 2024-11-22 RX ORDER — MIDAZOLAM HYDROCHLORIDE 1 MG/ML
INJECTION, SOLUTION INTRAMUSCULAR; INTRAVENOUS
Status: DISCONTINUED | OUTPATIENT
Start: 2024-11-22 | End: 2024-11-22 | Stop reason: SDUPTHER

## 2024-11-22 RX ORDER — ROPIVACAINE HYDROCHLORIDE 5 MG/ML
INJECTION, SOLUTION EPIDURAL; INFILTRATION; PERINEURAL PRN
Status: DISCONTINUED | OUTPATIENT
Start: 2024-11-22 | End: 2024-11-22 | Stop reason: ALTCHOICE

## 2024-11-22 RX ADMIN — PROPOFOL 80 MG: 10 INJECTION, EMULSION INTRAVENOUS at 08:28

## 2024-11-22 RX ADMIN — KETOROLAC TROMETHAMINE 30 MG: 30 INJECTION, SOLUTION INTRAMUSCULAR at 08:48

## 2024-11-22 RX ADMIN — LIDOCAINE HYDROCHLORIDE 50 MG: 10 INJECTION, SOLUTION EPIDURAL; INFILTRATION; INTRACAUDAL; PERINEURAL at 08:28

## 2024-11-22 RX ADMIN — PROPOFOL 100 MCG/KG/MIN: 10 INJECTION, EMULSION INTRAVENOUS at 08:29

## 2024-11-22 RX ADMIN — FENTANYL CITRATE 50 MCG: 50 INJECTION, SOLUTION INTRAMUSCULAR; INTRAVENOUS at 08:28

## 2024-11-22 RX ADMIN — SODIUM CHLORIDE: 9 INJECTION, SOLUTION INTRAVENOUS at 07:53

## 2024-11-22 RX ADMIN — FENTANYL CITRATE 25 MCG: 50 INJECTION, SOLUTION INTRAMUSCULAR; INTRAVENOUS at 08:43

## 2024-11-22 RX ADMIN — MIDAZOLAM 2 MG: 1 INJECTION INTRAMUSCULAR; INTRAVENOUS at 08:23

## 2024-11-22 RX ADMIN — FENTANYL CITRATE 25 MCG: 50 INJECTION, SOLUTION INTRAMUSCULAR; INTRAVENOUS at 08:34

## 2024-11-22 RX ADMIN — SODIUM CHLORIDE, PRESERVATIVE FREE 10 ML: 5 INJECTION INTRAVENOUS at 07:52

## 2024-11-22 RX ADMIN — ONDANSETRON 4 MG: 2 INJECTION INTRAMUSCULAR; INTRAVENOUS at 08:48

## 2024-11-22 ASSESSMENT — PAIN DESCRIPTION - DESCRIPTORS: DESCRIPTORS: BURNING

## 2024-11-22 ASSESSMENT — PAIN - FUNCTIONAL ASSESSMENT
PAIN_FUNCTIONAL_ASSESSMENT: 0-10
PAIN_FUNCTIONAL_ASSESSMENT: PREVENTS OR INTERFERES SOME ACTIVE ACTIVITIES AND ADLS

## 2024-11-22 NOTE — H&P
ORTHOPEDIC PREOP HISTORY AND PHYSICAL      HPI / Chief Complaint  Lissette Griffiths is a 54 y.o. female who presents for bilateral middle finger pain    Past Medical History  Lissette  has a past medical history of Acid reflux, Anxiety, Asthma, Diabetes mellitus (HCC), History of sleep walking, Hyperlipidemia, Hypertension, Migraine, Migraines, Narcolepsy, STUART (nonalcoholic steatohepatitis), Sleep apnea, Tachycardia, Thrush, and Trigger finger.    Past Surgical History  Lissette  has a past surgical history that includes back surgery (2003); Carpal tunnel release (Right, 02/10/2022); Carpal tunnel release (Left, 02/02/2023); skin biopsy; [place] tooth extraction; Colonoscopy; Esophagogastroduodenoscopy; Knee arthroscopy (Right, 08/29/2023); liver biopsy; Knee arthroscopy (Left, 06/06/2024); and Finger trigger release (Bilateral).    Current Medications  Current Facility-Administered Medications   Medication Dose Route Frequency Provider Last Rate Last Admin    sodium chloride flush 0.9 % injection 5-40 mL  5-40 mL IntraVENous 2 times per day Dorina Buckley PA-C        sodium chloride flush 0.9 % injection 5-40 mL  5-40 mL IntraVENous PRN Dorina Buckley PA-C        0.9 % sodium chloride infusion   IntraVENous PRN Dorina Buckley PA-C 125 mL/hr at 11/22/24 0759 NoRateChange at 11/22/24 0759    lidocaine PF 1 % injection 1 mL  1 mL IntraDERmal Once PRN Martin Torre MD        lactated ringers infusion   IntraVENous Continuous Martin Torre MD        sodium chloride flush 0.9 % injection 5-40 mL  5-40 mL IntraVENous 2 times per day Martin Torre MD        sodium chloride flush 0.9 % injection 5-40 mL  5-40 mL IntraVENous PRN Martin Torre MD   10 mL at 11/22/24 0752    0.9 % sodium chloride infusion   IntraVENous PRN Martin Torre MD        ROPivacaine (NAROPIN) 0.5% injection                Allergies  Allergies have been reviewed.  Lissette is allergic to actos [pioglitazone], canagliflozin, other, reglan   [metoclopramide], tuberculin ppd, and adhesive tape.    Social History  Lissette  reports that she has never smoked. She has never been exposed to tobacco smoke. She has never used smokeless tobacco. She reports current alcohol use. She reports that she does not use drugs.    Family History  Lissette's family history is not on file.      Review of Systems   History obtained from the patient.   REVIEW OF SYSTEMS:   Constitution: negative for fever, chills    Physical Exam  BP (!) 143/66   Pulse 73   Temp 97.3 °F (36.3 °C) (Infrared)   Resp 16   Ht 1.676 m (5' 6\")   Wt 105.3 kg (232 lb 3.2 oz)   LMP 07/10/2024 Comment: LMP july 2024 POCT negative  SpO2 97%   BMI 37.48 kg/m²    General Appearance: in no distress  HEENT: Normocephalic  CV: brisk cap refill to extremities  Lungs: Nonlabored breathing  Abdomen: soft  Extremities: bilateral hand skin intact    Assessment and Plan  Lissette Griffiths is a 54 y.o. old female with bilateral middle finger trigger finger.  Plan for bilateral middle finger A1 pulley release    This note is created with the assistance of a speech recognition program.  While intending to generate a document that actually reflects the content of the visit, the document can still have some errors including those of syntax and sound a like substitutions which may escape proof reading.  It such instances, actual meaning can be extrapolated by contextual diversion.

## 2024-11-22 NOTE — ANESTHESIA POSTPROCEDURE EVALUATION
Department of Anesthesiology  Postprocedure Note    Patient: Lissette Griffiths  MRN: [id number]  YOB: 1970  Date of evaluation: 11/22/2024    Procedure Summary       Date: 11/22/24 Room / Location: [address]    Anesthesia Start: 0823 Anesthesia Stop: 0908    Procedure: BILATERAL MIDDLE FINGER TRIGGER RELEASE/TENOSYNOVECTOMY (Bilateral: Hand) Diagnosis:       Acquired trigger finger of right middle finger      Acquired trigger finger of left middle finger      (Acquired trigger finger of right middle finger [M65.331])      (Acquired trigger finger of left middle finger [M65.332])    Surgeons: Jarvis Boss MD Responsible Provider: Martin Torre MD    Anesthesia Type: TIVA ASA Status: 3            Anesthesia Type: No value filed.    Rashad Phase I: Rashad Score: 10    Rashad Phase II: Rashad Score: 10    Anesthesia Post Evaluation    Patient location during evaluation: PACU  Patient participation: complete - patient participated  Level of consciousness: awake and awake and alert  Pain score: 0  Nausea & Vomiting: no nausea and no vomiting  Cardiovascular status: hemodynamically stable and blood pressure returned to baseline  Respiratory status: acceptable  Hydration status: euvolemic  Multimodal analgesia pain management approach  Pain management: adequate    No notable events documented.

## 2024-11-22 NOTE — OP NOTE
Operative Note      Patient: Lissette Griffiths  YOB: 1970  MRN: [id number]    Date of Procedure: 11/22/2024    Pre-Op Diagnosis Codes:      * Acquired trigger finger of right middle finger [M65.331]     * Acquired trigger finger of left middle finger [M65.332]    Post-Op Diagnosis: Same       Procedure(s):  BILATERAL MIDDLE FINGER TRIGGER RELEASE/TENOSYNOVECTOMY    Surgeon(s):  Jarvis Boss MD    Assistant:   Jana Stephen CNP    Anesthesia: Monitor Anesthesia Care    Estimated Blood Loss (mL): Minimal    Complications: None    Specimens:   * No specimens in log *    Implants:  * No implants in log *      Drains: * No LDAs found *    Findings:  Infection Present At Time Of Surgery (PATOS) (choose all levels that have infection present):  No infection present  Other Findings:     Detailed Description of Procedure:   Informed consent was obtained.  I marked the right middle finger and left middle finger. The patient was brought back to the operating room where monitored sedation was begun.  The appropriate timeout was performed.  No antibiotics were given as none were indicated.  I cleansed the operative site.  I anesthetized the surgical site with half percent ropivacaine plain.  The hand was prepped and draped in normal sterile fashion.  I made an incision over the right middle finger A1 pulley.  I bluntly dissected through the subcutaneous tissue.  I used a knife and scissors to release the A1 pulley proximally and distally.  I protected the neurovascular bundles.  I pulled the flexor tendon out of the wound and there were no adhesions.  The skin was closed with Monocryl and skin glue.  A sterile dry dressing was applied.  Sponge and needle counts were correct.    Then in a similar fashion I made an incision over the left middle finger A1 pulley.  I bluntly dissected through the subcutaneous tissue.  I used a knife and scissors to release the A1 pulley proximally and distally.  I protected the

## 2024-11-22 NOTE — DISCHARGE INSTRUCTIONS
Leave the surgical bandage in place for 2 days.  Then you can remove it and shower and replace with a new bandage or a large Band-Aid each day.  It is okay and encouraged to move the fingers throughout the day.  It is okay to use that hand for activities of daily living.  Avoid heavy gripping or lifting until seen in the office.    Call your doctor now or seek immediate medical care if:     You have pain that does not get better after you take pain medicine.   You have a fever over 101°F.   You have chills   You have signs of infection, such as:   Increased pain, swelling, warmth, or redness.   Red streaks leading from the incision.   Pus draining from the incision.     No alcoholic beverages, no driving or operating machinery, no making important decisions for 24 hours.   You may have a normal diet but should eat lightly day of surgery.  Drink plenty of fluids.  Urinate within 8 hours after surgery, if unable to urinate call your doctor

## 2024-11-22 NOTE — ANESTHESIA PRE PROCEDURE
Migraines     Narcolepsy     STUART (nonalcoholic steatohepatitis)     Sleep apnea     uses bipap    Tachycardia     Thrush     Trigger finger        Past Surgical History:        Procedure Laterality Date    BACK SURGERY  2003    discectomy    CARPAL TUNNEL RELEASE Right 02/10/2022    CARPAL TUNNEL RELEASE Left 02/02/2023    with trigger finger release    COLONOSCOPY      ESOPHAGOGASTRODUODENOSCOPY      FINGER TRIGGER RELEASE Bilateral     ring finger    KNEE ARTHROSCOPY Right 08/29/2023    KNEE ARTHROSCOPY PARTIAL MEDIAL MENISCECTOMY performed by Dean Cerrato MD at [facility] OR    KNEE ARTHROSCOPY Left 06/06/2024    KNEE ARTHROSCOPY PARTIAL MEDIAL MENISCECTOMY performed by Dean Cerrato MD at [facility] OR    LIVER BIOPSY      SKIN BIOPSY      ears    WISDOM TOOTH EXTRACTION         Social History:    Social History     Tobacco Use    Smoking status: Never     Passive exposure: Never    Smokeless tobacco: Never   Substance Use Topics    Alcohol use: Yes     Comment: rare                                Counseling given: Not Answered      Vital Signs (Current):   Vitals:    11/18/24 1638 11/22/24 0738   BP:  (!) 143/66   Pulse:  73   Resp:  16   Temp:  97.3 °F (36.3 °C)   TempSrc:  Infrared   SpO2:  97%   Weight: 102.5 kg (226 lb) 105.3 kg (232 lb 3.2 oz)   Height: 1.676 m (5' 6\") 1.676 m (5' 6\")                                              BP Readings from Last 3 Encounters:   11/22/24 (!) 143/66   06/22/24 110/68   06/06/24 131/63       NPO Status: Time of last liquid consumption: 2200                        Time of last solid consumption: 2100                        Date of last liquid consumption: 11/21/24                        Date of last solid food consumption: 11/21/24    BMI:   Wt Readings from Last 3 Encounters:   11/22/24 105.3 kg (232 lb 3.2 oz)   07/30/24 103.4 kg (228 lb)   06/22/24 103.4 kg (228 lb)     Body mass index is 37.48 kg/m².    CBC:   Lab Results   Component Value Date/Time    WBC 7.97

## 2024-11-27 ENCOUNTER — OFFICE VISIT (OUTPATIENT)
Age: 54
End: 2024-11-27

## 2024-11-27 DIAGNOSIS — M65.331 ACQUIRED TRIGGER FINGER OF RIGHT MIDDLE FINGER: ICD-10-CM

## 2024-11-27 DIAGNOSIS — M65.332 ACQUIRED TRIGGER FINGER OF LEFT MIDDLE FINGER: Primary | ICD-10-CM

## 2024-11-27 PROCEDURE — 99024 POSTOP FOLLOW-UP VISIT: CPT | Performed by: NURSE PRACTITIONER

## 2024-11-27 NOTE — PROGRESS NOTES
[facility] Orthopedics & Sports Medicine      [facility] PHYSICIANS [facility]  [facility] ORTHOPAEDICS AND SPORTS MEDICINE  [address]  [place] [address]  Dept: [phone]  Dept Fax: [phone]    Chief Compliant:  No chief complaint on file.       History of Present Illness:  11/27/24:This is a pleasant 54 y.o. female who is 5 days status post bilateral middle finger A1 pulley release. She presents today for a wound check and is concerned about the lack of motion of both middle fingers. States she has been keeping the wounds covered with bandaids and changing daily but has noticed they opened up. States she is unable to fully extend the middle fingers and it is really bothering her. Pain has been manageable. No recent injuries or falls.     Physical Exam: Left hand: Surgical incision with superficial dehiscence, no drainage or erythema. Surrounding ecchymosis. Unable to fully extend the middle finger at the MCP and PIP joint.       Right hand: Surgical incision with superficial dehiscence, moisture but no drainage or erythema. Surrounding ecchymosis. Unable to fully extend the middle finger at the MCP and PIP joint.         Imaging: None      Assessment and Plan:    This is a pleasant 54 y.o. female who is status post above. She does have some superficial dehiscence of both wounds, no evidence of infection. She also has stiffness in both middle fingers, recommend formal hand therapy. Keep wounds covered with bandages and change daily. No soaks, ointments or creams. Follow up as scheduled on 12/5.         Past History:    Current Outpatient Medications:     ibuprofen (ADVIL;MOTRIN) 800 MG tablet, Take 1 tablet by mouth 3 times daily (with meals) for 10 days, Disp: 30 tablet, Rfl: 0    acetaminophen (TYLENOL) 500 MG tablet, Take 2 tablets by mouth every 6 hours as needed for Pain, Disp: 30 tablet, Rfl: 0    modafinil (PROVIGIL) 100 MG tablet, Take 2 tablets by mouth

## 2024-11-29 ENCOUNTER — HOSPITAL ENCOUNTER (OUTPATIENT)
Facility: CLINIC | Age: 54
Setting detail: THERAPIES SERIES
Discharge: HOME | End: 2024-11-29
Payer: COMMERCIAL

## 2024-11-29 PROCEDURE — 97140 MANUAL THERAPY 1/> REGIONS: CPT

## 2024-11-29 PROCEDURE — 97165 OT EVAL LOW COMPLEX 30 MIN: CPT

## 2024-11-29 PROCEDURE — 97110 THERAPEUTIC EXERCISES: CPT

## 2024-11-29 NOTE — THERAPY EVALUATION
[] [facility]  Outpatient Rehabilitation &  Therapy  [address]  [phone]  F: [phone] [x] [facility]  Outpatient Rehabilitation &  Therapy  [address]   [address]  P: [phone]  F: [phone] [] [facility]  Outpatient Rehabilitation &  Therapy  [address]  P: [phone]  F: [phone] [] [facility]  Outpatient Rehabilitation &  Therapy  [address]   P: [phone]  F: [phone] [] [facility]   Outpatient Rehabilitation   & Therapy  [address]  P: [phone]   F: [phone]       Occupational Therapy Hand & Upper Extremity  Initial Evaluation    Date: 2024      Patient: Lissette Griffiths  : 1970  MRN: [id number]  Referring Provider:  Jana Stephen APRN - CNP    Insurance: [facility]   Medical Diagnosis:   M65.332 (ICD-10-CM) - Acquired trigger finger of left middle finger   M65.331 (ICD-10-CM) - Acquired trigger finger of right middle finger     Rehab Codes: pain in right hand M79.641, pain in left hand M79.642, pain in right wrist M25.531, stiffness in right hand M25.641, and stiffness in left hand M25.642  Surgery Date: 24   Nisha Bhatti Appt: 24    Insurance/Authorization:   Visit Limit: 20   Visits Remainin   Hard Max?       [x]  yes      []  no   Are visits combined? Yes PTOTST   Authorization Required?   [] yes     [x]  no         Subjective:   Current Complaints:   She has bilat trigger fingers. She has had several shots, and she is now post op for both middle fingers.   She is a week post op, stutures are internal/disolvable, and she is wearing bandaids.   The middle fingers are still contracted in flex, the Lt is worse than the right.        Mechanism of Injury: chronic    Surgery Date: 24   Precautions: No  strengthening     Involved Extremity: Bilateral     Dominant Extremity: Right    Orthosis: Planned for subsequent

## 2024-12-03 ENCOUNTER — HOSPITAL ENCOUNTER (OUTPATIENT)
Facility: CLINIC | Age: 54
Setting detail: THERAPIES SERIES
Discharge: HOME | End: 2024-12-03
Payer: COMMERCIAL

## 2024-12-03 PROCEDURE — 97140 MANUAL THERAPY 1/> REGIONS: CPT

## 2024-12-03 PROCEDURE — 97110 THERAPEUTIC EXERCISES: CPT

## 2024-12-03 NOTE — FLOWSHEET NOTE
[] [facility]  Outpatient Rehabilitation &  Therapy  [address]  [phone]  F: [phone] [x] [facility]  Outpatient Rehabilitation &  Therapy  [address]   [address]  P: [phone]  F: [phone] [] [facility]  Outpatient Rehabilitation &  Therapy  [address]  P: [phone]  F: [phone] [] [facility]  Outpatient Rehabilitation &  Therapy  [address]   P: [phone]  F: [phone] [] [facility]   Outpatient Rehabilitation   & Therapy  [address]  P: [phone]   F: [phone]     Occupational Therapy Daily Treatment Note    Date:  12/3/2024  Patient Name:  Lissette Griffiths    :  1970  MRN: [id number]  Referring Provider:  Jana Stephen APRN - CNP     Insurance: [facility]   Medical Diagnosis:   M65.332 (ICD-10-CM) - Acquired trigger finger of left middle finger   M65.331 (ICD-10-CM) - Acquired trigger finger of right middle finger      Rehab Codes: pain in right hand M79.641, pain in left hand M79.642, pain in right wrist M25.531, stiffness in right hand M25.641, and stiffness in left hand M25.642  Surgery Date: 24            Nisha Bhatti Appt: 24   Visit# / total visits: ; Progress note for Medicare patient due at visit 10    Cancels/No Shows: 0/0      Subjective:    Pain:  [x] Yes  [] No Location: B hands  Pain Rating: (0-10 scale) Left 6-7/10 Right 1/10  Pain altered Tx:  [x] No  [] Yes  Action:  Pt Comments:  Today my Left hand/fingers > Right, is the most painful since surgery.    I have been trying to do my exercises.   I go Back to work Monday.      Precautions [] No  [x] Yes:  No strengthening till 24 :  Surgery procedure and date:   24  bilat middle finger trigger releases     Objective:  Today's Treatment:  Modalities:  Exercises:  Exercise Flow Sheet:  Exercise Reps/Time Weight/Level Comments Completed    Heated stretch 10min

## 2024-12-05 ENCOUNTER — OFFICE VISIT (OUTPATIENT)
Age: 54
End: 2024-12-05

## 2024-12-05 VITALS — HEIGHT: 66 IN | BODY MASS INDEX: 37.28 KG/M2 | WEIGHT: 232 LBS

## 2024-12-05 DIAGNOSIS — M65.332 ACQUIRED TRIGGER FINGER OF LEFT MIDDLE FINGER: ICD-10-CM

## 2024-12-05 DIAGNOSIS — M65.331 ACQUIRED TRIGGER FINGER OF RIGHT MIDDLE FINGER: Primary | ICD-10-CM

## 2024-12-05 PROCEDURE — 99024 POSTOP FOLLOW-UP VISIT: CPT | Performed by: NURSE PRACTITIONER

## 2024-12-05 NOTE — PROGRESS NOTES
12/5/2024 at 8:07 AM     Please note that this chart was generated using voice recognition Dragon dictation software.  Although every effort was made to ensure the accuracy of this automated transcription, some errors in transcription may have occurred.

## 2024-12-06 ENCOUNTER — HOSPITAL ENCOUNTER (OUTPATIENT)
Facility: CLINIC | Age: 54
Setting detail: THERAPIES SERIES
Discharge: HOME | End: 2024-12-06
Payer: COMMERCIAL

## 2024-12-06 PROCEDURE — 97110 THERAPEUTIC EXERCISES: CPT

## 2024-12-06 PROCEDURE — 97760 ORTHOTIC MGMT&TRAING 1ST ENC: CPT

## 2024-12-06 PROCEDURE — 97140 MANUAL THERAPY 1/> REGIONS: CPT

## 2024-12-06 NOTE — FLOWSHEET NOTE
[] [facility]  Outpatient Rehabilitation &  Therapy  [address]  [phone]  F: [phone] [x] [facility]  Outpatient Rehabilitation &  Therapy  [address]   [address]  P: [phone]  F: [phone] [] [facility]  Outpatient Rehabilitation &  Therapy  [address]  P: [phone]  F: [phone] [] [facility]  Outpatient Rehabilitation &  Therapy  [address]   P: [phone]  F: [phone] [] [facility]   Outpatient Rehabilitation   & Therapy  [address]  P: [phone]   F: [phone]     Occupational Therapy Daily Treatment Note    Date:  2024  Patient Name:  Lissette Griffiths    :  1970  MRN: [id number]  Referring Provider:  Jana Stephen APRN - CNP     Insurance: [facility]   Medical Diagnosis:   M65.332 (ICD-10-CM) - Acquired trigger finger of left middle finger   M65.331 (ICD-10-CM) - Acquired trigger finger of right middle finger      Rehab Codes: pain in right hand M79.641, pain in left hand M79.642, pain in right wrist M25.531, stiffness in right hand M25.641, and stiffness in left hand M25.642  Surgery Date: 24            Nisha Bhatti Appt: PRN   Visit# / total visits: 3/20; Progress note for Medicare patient due at visit 10    Cancels/No Shows: 0/0      Subjective:      Pt Comments:    Saw MD yesterday and was released from care.     She is two weeks post op.   The left finger still isn't straight.   The scars feel like the are \"tearing.\"   I go Back to work Monday.      Pain:  [x] Yes  [] No Location: B hands  Pain Rating: (0-10 scale) Left 6-7/10 Right 1/10  Pain altered Tx:  [x] No  [] Yes  Action:      Precautions [] No  [x] Yes:  No strengthening till 24 :  Surgery procedure and date:   24  bilat middle finger trigger releases     Objective:      ROM/STRENGTH:     Date of Measurements   Rt  Lt             Digit flex from the [facility]

## 2024-12-09 ENCOUNTER — HOSPITAL ENCOUNTER (OUTPATIENT)
Facility: CLINIC | Age: 54
Setting detail: THERAPIES SERIES
Discharge: HOME | End: 2024-12-09
Payer: COMMERCIAL

## 2024-12-09 PROCEDURE — 97140 MANUAL THERAPY 1/> REGIONS: CPT

## 2024-12-09 PROCEDURE — 97110 THERAPEUTIC EXERCISES: CPT

## 2024-12-12 ENCOUNTER — HOSPITAL ENCOUNTER (OUTPATIENT)
Facility: CLINIC | Age: 54
Setting detail: THERAPIES SERIES
Discharge: HOME | End: 2024-12-12
Payer: COMMERCIAL

## 2024-12-12 PROCEDURE — 97140 MANUAL THERAPY 1/> REGIONS: CPT

## 2024-12-12 PROCEDURE — 97110 THERAPEUTIC EXERCISES: CPT

## 2024-12-12 NOTE — FLOWSHEET NOTE
[] [facility]  Outpatient Rehabilitation &  Therapy  [address]  [phone]  F: [phone] [x] [facility]  Outpatient Rehabilitation &  Therapy  [address]   [address]  P: [phone]  F: [phone] [] [facility]  Outpatient Rehabilitation &  Therapy  [address]  P: [phone]  F: [phone] [] [facility]  Outpatient Rehabilitation &  Therapy  [address]   P: [phone]  F: [phone] [] [facility]   Outpatient Rehabilitation   & Therapy  [address]  P: [phone]   F: [phone]     Occupational Therapy Daily Treatment Note    Date:  2024  Patient Name:  Lissette Griffiths    :  1970  MRN: [id number]  Referring Provider:  Jana Stephen APRN - CNP     Insurance: [facility]   Medical Diagnosis:   M65.332 (ICD-10-CM) - Acquired trigger finger of left middle finger   M65.331 (ICD-10-CM) - Acquired trigger finger of right middle finger      Rehab Codes: pain in right hand M79.641, pain in left hand M79.642, pain in right wrist M25.531, stiffness in right hand M25.641, and stiffness in left hand M25.642  Surgery Date: 24            Nisha Bhatti Appt: PRN   Visit# / total visits: ; Progress note for Medicare patient due at visit 10    Cancels/No Shows: 0/0    Subjective:      Pt Comments:   Left Hand   My left finger is so sore and will not as straighten.  Not sure how position was this morning but as the day went on it is more flexed and more pain.  She was able to wear the finger ext splint last night did not wear and the night prior about 6 hours.   She wearing silicone pad on Left.      Pain:  [x] Yes  [] No Location: B hands    Pain Rating: (0-10 scale) Left 8/10 Right 0/10  Pain altered Tx:  [x] No  [] Yes  Action:      Precautions [] No  [x] Yes:  No strengthening till 24 :  Surgery procedure and date:   24  bilat middle finger trigger

## 2024-12-16 ENCOUNTER — HOSPITAL ENCOUNTER (OUTPATIENT)
Facility: CLINIC | Age: 54
Setting detail: THERAPIES SERIES
Discharge: HOME | End: 2024-12-16
Payer: COMMERCIAL

## 2024-12-16 PROCEDURE — 97110 THERAPEUTIC EXERCISES: CPT

## 2024-12-16 PROCEDURE — 97035 APP MDLTY 1+ULTRASOUND EA 15: CPT

## 2024-12-16 PROCEDURE — 97140 MANUAL THERAPY 1/> REGIONS: CPT

## 2024-12-16 NOTE — FLOWSHEET NOTE
[] [facility]  Outpatient Rehabilitation &  Therapy  [address]  [phone]  F: [phone] [x] [facility]  Outpatient Rehabilitation &  Therapy  [address]  P: [phone]  F: [phone] [] [facility]  Outpatient Rehabilitation &  Therapy  [address]  P: [phone]  F: [phone] [] [facility]  Outpatient Rehabilitation &  Therapy  [address]   P: [phone]  F: [phone] [] [facility]   Outpatient Rehabilitation   & Therapy  [address]  P: [phone]   F: [phone]     Occupational Therapy Daily Treatment Note    Date:  2024  Patient Name:  Lissette Griffiths    :  1970  MRN: [id number]  Referring Provider:  Jana Stephen APRN - CNP     Insurance: [facility]   Medical Diagnosis:   M65.332 (ICD-10-CM) - Acquired trigger finger of left middle finger   M65.331 (ICD-10-CM) - Acquired trigger finger of right middle finger      Rehab Codes: pain in right hand M79.641, pain in left hand M79.642, pain in right wrist M25.531, stiffness in right hand M25.641, and stiffness in left hand M25.642  Surgery Date: 24            Nisha Bhatti Appt: PRN   Visit# / total visits: ; Progress note for Medicare patient due at visit 10    Cancels/No Shows: 0/0    Subjective:      Pt Comments:   Left Hand   There hasn't been anymore clicking, but she forgot about the DIP blocking exercise.   She is wearing the finger ext splint at night, pain is a little better today.   The silicone scar pad helps.   She has ordered the LMB dynamic ext splint.     Rt hand  Scar is thicker but less painful      Pain:  [x] Yes  [] No Location: B hands    Pain Rating: (0-10 scale) Left 2/10 Right 0/10  Pain altered Tx:  [x] No  [] Yes  Action:      Precautions [] No  [x] Yes:  No strengthening till 24   Surgery procedure and date:   24  bilat middle finger trigger

## 2024-12-19 ENCOUNTER — HOSPITAL ENCOUNTER (OUTPATIENT)
Facility: CLINIC | Age: 54
Setting detail: THERAPIES SERIES
End: 2024-12-19
Payer: COMMERCIAL

## 2024-12-20 ENCOUNTER — HOSPITAL ENCOUNTER (OUTPATIENT)
Facility: CLINIC | Age: 54
Setting detail: THERAPIES SERIES
Discharge: HOME | End: 2024-12-20
Payer: COMMERCIAL

## 2024-12-20 PROCEDURE — 97035 APP MDLTY 1+ULTRASOUND EA 15: CPT

## 2024-12-20 PROCEDURE — 97110 THERAPEUTIC EXERCISES: CPT

## 2024-12-20 NOTE — FLOWSHEET NOTE
(pounds)  12/20/24 48.5 lbs (2 trials) (1/10 pain) 28 lbs (2 trials) (2-3/10) pain   3 point pinch (pounds) 12/20/24  15 lbs (.5/10 pain)  12.5 lbs (1/10 pain)                      Today's Treatment:  Modalities:   Modality Flow Sheet:  START STOP Tx Modality     Electrical Stim:     12/16/24  left  Ultrasound: __0.8_ W/cm2 x 7 mins (+3 min set up)  Duty factor: x 100%  __50%  __33% __20%  Head size:    2 cm  MHz: x 1mHz  __3mHz  Location: LMF A1 pulley region     Hot Pack:     Cold Pack:       Precautions:    WBAT  Exercise Flow Sheet:  Exercise Reps/Time Weight/  Level Comments Completed    Heated stretch  10 min  RUE RMF LMB (C) ext while US was completed on the L side X   Manual STM   Scar  bilat   Edema  STM B palm  guasha      Manual digit ext   PROM   bialt PIP ext PROM     Manual digit flex    PROM   bilat Composite digit flex   Intrinsic stretches    TGE    AROM  bilat  Fist   Claw     Blocking   AROM   bilat PIP flex  DIP flex      FMC             bilat In hand manip   Crumpling paper  Towel scrunches        Tennis ball        bilat massage  Bounce/catch    Finger ADD  isometrics Intrinsic wedges    vibration  bilat To scar     Putty  10x each      10x each RUE, LUE, Tan    RUE, LUE, Tan  strengthening squeezes    3-point pinching   X      X                        ortho   Custom thermoplastic   PIP ext splint   (to be worn at night and/or 20 min 3x/day)     HEP     Medbridge   Tubigrip size b  Silicone scar pad        Specific Instructions for Next Treatment: MHP with LMB, US, Scar massage, HEP review as needed, PROM, Progress light strengthening    HEP (Medbridge)   Access Code: [email or web address]  URL: [email or web address]  Date: 12/06/2024  Prepared by: Shawanda Oswald    Exercises  - PIP PROM Extension  - 5 x daily - 6 x weekly - 5 reps - 20 hold  - Hand PROM Finger Extension  - 3 x daily - 6 x weekly - 10 reps  - PROM Composite Flexion  - 3 x daily - 6 x weekly - 3 reps - 10 hold  - Hand AROM PIP

## 2024-12-24 ENCOUNTER — HOSPITAL ENCOUNTER (OUTPATIENT)
Facility: CLINIC | Age: 54
Setting detail: THERAPIES SERIES
Discharge: HOME | End: 2024-12-24
Payer: COMMERCIAL

## 2024-12-24 PROCEDURE — 97035 APP MDLTY 1+ULTRASOUND EA 15: CPT

## 2024-12-24 PROCEDURE — 97110 THERAPEUTIC EXERCISES: CPT

## 2024-12-24 NOTE — FLOWSHEET NOTE
Other:       Time In: 9:05am  Time Out: 9:40am      Treatment Charges: Mins Units   []  Modalities:      [x]  Ultrasound 10  1   [x]  Ther Exercise 15 1   []  Manual Therapy     []  Ther Activities     []  Orthotic fit/train     []  Orthotic recheck     []  Other     Total Billable time 35 2       Electronically signed by:  GERALDO CARRINGTON/YARED

## 2024-12-30 ENCOUNTER — HOSPITAL ENCOUNTER (OUTPATIENT)
Facility: CLINIC | Age: 54
Setting detail: THERAPIES SERIES
Discharge: HOME | End: 2024-12-30
Payer: COMMERCIAL

## 2024-12-30 PROCEDURE — 97035 APP MDLTY 1+ULTRASOUND EA 15: CPT

## 2024-12-30 PROCEDURE — 97110 THERAPEUTIC EXERCISES: CPT

## 2024-12-30 NOTE — FLOWSHEET NOTE
[] [facility]  Outpatient Rehabilitation &  Therapy  [address]  [phone]  F: [phone] [x] [facility]  Outpatient Rehabilitation &  Therapy  [address]   [address]  P: [phone]  F: [phone] [] [facility]  Outpatient Rehabilitation &  Therapy  [address]  P: [phone]  F: [phone] [] [facility]  Outpatient Rehabilitation &  Therapy  [address]   P: [phone]  F: [phone] [] [facility]   Outpatient Rehabilitation   & Therapy  [address]  P: [phone]   F: [phone]     Occupational Therapy Daily Treatment Note    Date:  2024  Patient Name:  Lissette Griffiths    :  1970  MRN: [id number]  Referring Provider:  Jana Stephen APRN - CNP     Insurance: [facility]   Medical Diagnosis:   M65.332 (ICD-10-CM) - Acquired trigger finger of left middle finger   M65.331 (ICD-10-CM) - Acquired trigger finger of right middle finger      Rehab Codes: pain in right hand M79.641, pain in left hand M79.642, pain in right wrist M25.531, stiffness in right hand M25.641, and stiffness in left hand M25.642  Surgery Date: 24            Nisha Bhatti Appt: PRN   Visit# / total visits: ; Progress note for Medicare patient due at visit 10    Cancels/No Shows: 0/0    Subjective:      Pt Comments:   She can use the hand for more things, carrying grocery bags is better.   She is able to play video games.   Still having difficulty with WB to get off the floor.   She is going to try indoor bicycling         Pain:  [x] Yes  [] No Location: volar RMF a1 pulley region    Pain Ratin/10 (0-10 scale)  Pain altered Tx:  [x] No  [] Yes    Action: N/A      Precautions [] No  [x] Yes:  No strengthening till 24   Surgery procedure and date:   24  bilat middle finger trigger releases     Objective:      ROM/STRENGTH:     Date of last Measurements: 24   Rt  Lt

## 2025-01-06 ENCOUNTER — HOSPITAL ENCOUNTER (OUTPATIENT)
Facility: CLINIC | Age: 55
Setting detail: THERAPIES SERIES
Discharge: HOME | End: 2025-01-06
Payer: COMMERCIAL

## 2025-01-06 PROCEDURE — 97110 THERAPEUTIC EXERCISES: CPT

## 2025-01-06 PROCEDURE — 97140 MANUAL THERAPY 1/> REGIONS: CPT

## 2025-01-06 NOTE — PROGRESS NOTES
[] [facility]  Outpatient Rehabilitation &  Therapy  [address]  [phone]  F: [phone] [x] [facility]  Outpatient Rehabilitation &  Therapy  [address]   [address]  P: [phone]  F: [phone] [] [facility]  Outpatient Rehabilitation &  Therapy  [address]  P: [phone]  F: [phone] [] [facility]  Outpatient Rehabilitation &  Therapy  [address]   P: [phone]  F: [phone] [] [facility]   Outpatient Rehabilitation   & Therapy  [address]  P: [phone]   F: [phone]     Occupational Therapy Daily Treatment/Progress Note    Date:  2025  Patient Name:  Lissette Griffiths    :  1970  MRN: [id number]  Referring Provider:  Jana Stephen APRN - CNP     Insurance: [facility]   Medical Diagnosis:   M65.332 (ICD-10-CM) - Acquired trigger finger of left middle finger   M65.331 (ICD-10-CM) - Acquired trigger finger of right middle finger      Rehab Codes: pain in right hand M79.641, pain in left hand M79.642, pain in right wrist M25.531, stiffness in right hand M25.641, and stiffness in left hand M25.642  Surgery Date: 24            Nisha Bhatti Appt: PRN   Visit# / total visits: 10/20; Progress note due    Cancels/No Shows: 0/0    Subjective:      Pt Comments: Pt. Reports that she experienced pain at her L hand when attempting to twist a door knob over the weekend. Pt. Reports that she purchased a vibration device to use for scar management at home.     Pain:  [x] Yes  [] No   Location: volar LMF a1 pulley region    Pain Rating: 3-4/10 (0-10 scale)  Pain altered Tx:  [x] No  [] Yes    Action: N/A      Precautions [] No  [x] Yes:  No strengthening till 24   Surgery procedure and date:   24  bilat middle finger trigger releases     UEFI: 38/80, 52.5% functionally impaired (25)    Objective:      ROM/STRENGTH:     Date of last Measurements:

## 2025-01-17 ENCOUNTER — HOSPITAL ENCOUNTER (OUTPATIENT)
Facility: CLINIC | Age: 55
Setting detail: THERAPIES SERIES
Discharge: HOME | End: 2025-01-17
Payer: COMMERCIAL

## 2025-01-17 PROCEDURE — 97140 MANUAL THERAPY 1/> REGIONS: CPT

## 2025-01-17 PROCEDURE — 97110 THERAPEUTIC EXERCISES: CPT

## 2025-01-17 NOTE — FLOWSHEET NOTE
Darek    Exercises  - PIP PROM Extension  - 5 x daily - 6 x weekly - 5 reps - 20 hold  - Hand PROM Finger Extension  - 3 x daily - 6 x weekly - 10 reps  - PROM Composite Flexion  - 3 x daily - 6 x weekly - 3 reps - 10 hold  - Hand AROM PIP Blocking  - 3 x daily - 6 x weekly - 10 reps  - Hand AROM Reverse Blocking  - 3 x daily - 6 x weekly - 10 reps  - Anterior Wrist Scar Massage  - 3 x daily - 6 x weekly - 10 reps    - 12/12/24 added DIP blocking reviewed and video     - ADDED 12/20/24: Access Code: [email or web address]  URL: [email or web address]  Date: 12/20/2024  Prepared by: Zach Alexander    Exercises  - Putty Squeezes  - 1 x daily - 7 x weekly - 3 sets - 10 reps  - 3-Point Pinch with Putty  - 1 x daily - 7 x weekly - 3 sets - 10 reps    Assessment:     - Pt. Tolerated tx. Session well overall  - Pt. Reported that scar massage felt less painful for her today, as compared to previously  - Pt. Reported that rotating a puttycise tool counterclockwise through pink putty was more difficult for her, as compared to rotating it clockwise  - Pt. Reported a brief 2-3/10 pain at her L hand during the completion of  strengthening squeezes with pink putty  - Pt. Reported that her L hand felt better at the conclusion of the session, as compared to the beginning    [x] Progressing toward goals.      [] No change.  [] Other     [x] Patient would benefit from skilled occupational therapy services in order to: Improve  functional /grasp in order to Ensure safe return to work     STG/LTG  Short Term Goals: (  10   treatments )  Pain: Pt will report pain of 1/10 with ROM for doing blood pressures at work. 3-4/10 pain, PROGRESSING  ROM: Pt will have PIP ext to 10 degrees for donning gloves MET  ROM: Pt will have digit flex to within 1 cm fo the DPC for cutting food. 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 MET  Function: Improved

## 2025-01-24 ENCOUNTER — APPOINTMENT (OUTPATIENT)
Facility: CLINIC | Age: 55
End: 2025-01-24
Payer: COMMERCIAL

## 2025-01-31 ENCOUNTER — HOSPITAL ENCOUNTER (OUTPATIENT)
Facility: CLINIC | Age: 55
Setting detail: THERAPIES SERIES
Discharge: HOME | End: 2025-01-31
Payer: COMMERCIAL

## 2025-01-31 PROCEDURE — 97140 MANUAL THERAPY 1/> REGIONS: CPT

## 2025-01-31 PROCEDURE — 97110 THERAPEUTIC EXERCISES: CPT

## 2025-01-31 PROCEDURE — 97035 APP MDLTY 1+ULTRASOUND EA 15: CPT

## 2025-01-31 NOTE — THERAPY DISCHARGE
[] [facility]  Outpatient Rehabilitation &  Therapy  [address]  [phone]  F: [phone] [x] [facility]  Outpatient Rehabilitation &  Therapy  [address]   [address]  P: [phone]  F: [phone] [] [facility]  Outpatient Rehabilitation &  Therapy  [address]  P: [phone]  F: [phone] [] [facility]  Outpatient Rehabilitation &  Therapy  [address]   P: [phone]  F: [phone] [] [facility]   Outpatient Rehabilitation   & Therapy  [address]  P: [phone]   F: [phone]     Occupational Therapy Daily Treatment/Discharge Note    Date:  2025  Patient Name:  Lissette Griffiths    :  1970  MRN: [id number]  Referring Provider:  Jana Stephen APRN - CNP     Insurance: [facility]   Medical Diagnosis:   M65.332 (ICD-10-CM) - Acquired trigger finger of left middle finger   M65.331 (ICD-10-CM) - Acquired trigger finger of right middle finger      Rehab Codes: pain in right hand M79.641, pain in left hand M79.642, pain in right wrist M25.531, stiffness in right hand M25.641, and stiffness in left hand M25.642  Surgery Date: 24            Nisha Bhatti Appt: PRN   Visit# / total visits:     Cancels/No Shows: 0/0    Subjective:      Pt Comments: Pt. Reports that she occasionally wakes up at nighttime due to a burning pain at volar mirna. a1 pulley regions.     Pain:  [x] Yes  [] No   Location: volar mirna. a1 pulley regions    Pain Ratin-2/10 (0-10 scale)  Pain altered Tx:  [x] No  [] Yes    Action: N/A      Precautions [] No  [x] Yes:  No strengthening till 24   Surgery procedure and date:   24  bilat middle finger trigger releases     UEFI: 38/80, 52.5% functionally impaired (25)  UEFI: 78/80, 2.5% functionally impaired (25)    Objective:      ROM/STRENGTH:     Date of last Measurements: 25   Rt  Lt      3rd digit to DPC 25

## 2025-04-27 ENCOUNTER — TRANSCRIBE ORDERS (OUTPATIENT)
Dept: ADMINISTRATIVE | Age: 55
End: 2025-04-27

## 2025-04-27 DIAGNOSIS — R51.0 ORTHOSTATIC HEADACHE: Primary | ICD-10-CM

## 2025-04-27 DIAGNOSIS — G43.719 INTRACTABLE CHRONIC MIGRAINE WITHOUT AURA AND WITHOUT STATUS MIGRAINOSUS: ICD-10-CM

## 2025-05-14 ENCOUNTER — HOSPITAL ENCOUNTER (OUTPATIENT)
Facility: CLINIC | Age: 55
Discharge: HOME | End: 2025-05-16
Payer: COMMERCIAL

## 2025-05-14 DIAGNOSIS — G43.719 INTRACTABLE CHRONIC MIGRAINE WITHOUT AURA AND WITHOUT STATUS MIGRAINOSUS: ICD-10-CM

## 2025-05-14 DIAGNOSIS — R51.0 ORTHOSTATIC HEADACHE: ICD-10-CM

## 2025-05-14 LAB
EGFR, POC: 88 ML/MIN/1.73M2
POC CREATININE: 0.8 MG/DL (ref 0.51–1.19)

## 2025-05-14 PROCEDURE — A9579 GAD-BASE MR CONTRAST NOS,1ML: HCPCS | Performed by: NURSE PRACTITIONER

## 2025-05-14 PROCEDURE — 36415 COLL VENOUS BLD VENIPUNCTURE: CPT

## 2025-05-14 PROCEDURE — 82565 ASSAY OF CREATININE: CPT

## 2025-05-14 PROCEDURE — 6360000004 HC RX CONTRAST MEDICATION: Performed by: NURSE PRACTITIONER

## 2025-05-14 PROCEDURE — 70553 MRI BRAIN STEM W/O & W/DYE: CPT

## 2025-05-14 RX ADMIN — GADOTERIDOL 20 ML: 279.3 INJECTION, SOLUTION INTRAVENOUS at 08:55

## 2025-09-06 ENCOUNTER — HOSPITAL ENCOUNTER (EMERGENCY)
Age: 55
Discharge: HOME | End: 2025-09-06
Payer: COMMERCIAL

## 2025-09-06 ENCOUNTER — APPOINTMENT (OUTPATIENT)
Dept: GENERAL RADIOLOGY | Age: 55
End: 2025-09-06
Payer: COMMERCIAL

## 2025-09-06 VITALS
WEIGHT: 230 LBS | RESPIRATION RATE: 18 BRPM | HEIGHT: 65 IN | BODY MASS INDEX: 38.32 KG/M2 | TEMPERATURE: 98.6 F | DIASTOLIC BLOOD PRESSURE: 92 MMHG | HEART RATE: 78 BPM | SYSTOLIC BLOOD PRESSURE: 145 MMHG | OXYGEN SATURATION: 98 %

## 2025-09-06 DIAGNOSIS — S92.502A: Primary | ICD-10-CM

## 2025-09-06 PROCEDURE — 73630 X-RAY EXAM OF FOOT: CPT

## 2025-09-06 RX ORDER — RESMETIROM 60 MG/1
TABLET, COATED ORAL
COMMUNITY

## 2025-09-06 RX ORDER — OXYCODONE HYDROCHLORIDE 5 MG/1
5 TABLET ORAL EVERY 6 HOURS PRN
Qty: 12 TABLET | Refills: 0 | Status: SHIPPED | OUTPATIENT
Start: 2025-09-06 | End: 2025-09-09

## 2025-09-06 RX ORDER — ACETAMINOPHEN 500 MG
500 TABLET ORAL 4 TIMES DAILY PRN
Qty: 60 TABLET | Refills: 1 | Status: SHIPPED | OUTPATIENT
Start: 2025-09-06

## 2025-09-06 RX ORDER — IBUPROFEN 600 MG/1
600 TABLET, FILM COATED ORAL EVERY 6 HOURS PRN
Qty: 120 TABLET | Refills: 0 | Status: SHIPPED | OUTPATIENT
Start: 2025-09-06

## 2025-09-06 ASSESSMENT — PAIN SCALES - GENERAL
PAINLEVEL_OUTOF10: 9
PAINLEVEL_OUTOF10: 0

## 2025-09-06 ASSESSMENT — ENCOUNTER SYMPTOMS
EYES NEGATIVE: 1
GASTROINTESTINAL NEGATIVE: 1
RESPIRATORY NEGATIVE: 1

## 2025-09-06 ASSESSMENT — PAIN - FUNCTIONAL ASSESSMENT: PAIN_FUNCTIONAL_ASSESSMENT: 0-10

## (undated) DEVICE — SINGLE PORT MANIFOLD: Brand: NEPTUNE 2

## (undated) DEVICE — GLOVE ORTHO 8   MSG9480

## (undated) DEVICE — ZIMMER® STERILE DISPOSABLE TOURNIQUET CUFF WITH PLC, DUAL PORT, SINGLE BLADDER, 30 IN. (76 CM)

## (undated) DEVICE — GLOVE SURG SZ 65 THK91MIL LTX FREE SYN POLYISOPRENE

## (undated) DEVICE — 60-7070-103 TRNQT,DPSB,PLC RED: Brand: MEDLINE RENEWAL

## (undated) DEVICE — SOLUTION IRRIG 3000ML LAC RINGERS ARTHROMTC PLAS CONT

## (undated) DEVICE — LIQUIBAND RAPID ADHESIVE 36/CS 0.8ML: Brand: MEDLINE

## (undated) DEVICE — [TOMCAT CUTTER, ARTHROSCOPIC SHAVER BLADE,  DO NOT RESTERILIZE,  DO NOT USE IF PACKAGE IS DAMAGED,  KEEP DRY,  KEEP AWAY FROM SUNLIGHT]: Brand: FORMULA

## (undated) DEVICE — MHPB HAND AND FOOT PACK: Brand: MEDLINE INDUSTRIES, INC.

## (undated) DEVICE — SYRINGE, LUER LOCK, 10ML: Brand: MEDLINE

## (undated) DEVICE — DISPOSABLE TOURNIQUET CUFF SINGLE BLADDER, SINGLE PORT AND QUICK CONNECT CONNECTOR: Brand: COLOR CUFF

## (undated) DEVICE — DRESSING,GAUZE,XEROFORM,CURAD,1"X8",ST: Brand: CURAD

## (undated) DEVICE — ST CHARLES KNEE ARTHRO: Brand: MEDLINE INDUSTRIES, INC.

## (undated) DEVICE — GLOVE ORANGE PI 8   MSG9080

## (undated) DEVICE — BNDG,ELSTC,MATRIX,STRL,2"X5YD,LF,HOOK&LP: Brand: MEDLINE

## (undated) DEVICE — GLOVE SURG SZ 8 L12IN THK75MIL DK GRN LTX FREE

## (undated) DEVICE — SOLUTION IRRIG 1000ML 0.9% SOD CHL USP POUR PLAS BTL

## (undated) DEVICE — APPLICATOR MEDICATED 26 CC SOLUTION HI LT ORNG CHLORAPREP

## (undated) DEVICE — SUTURE MONOCRYL SZ 4 0 L18IN ABSRB UD P 3 3 8 CIR REV CUT NDL MCP494G

## (undated) DEVICE — SOLUTION IV IRRIG LACTATED RINGERS 3000ML 2B7487

## (undated) DEVICE — DRAPE,U/ SHT,SPLIT,PLAS,STERIL: Brand: MEDLINE

## (undated) DEVICE — STRAP,POSITIONING,KNEE/BODY,FOAM,4X60": Brand: MEDLINE

## (undated) DEVICE — CLOTH PRE OP W9XL10.5IN 2% CHG FRAGRANCE RNS FREE READYPREP